# Patient Record
Sex: FEMALE | Race: WHITE
[De-identification: names, ages, dates, MRNs, and addresses within clinical notes are randomized per-mention and may not be internally consistent; named-entity substitution may affect disease eponyms.]

---

## 2017-02-23 ENCOUNTER — HOSPITAL ENCOUNTER (OUTPATIENT)
Dept: HOSPITAL 47 - LABWHC1 | Age: 72
Discharge: HOME | End: 2017-02-23
Attending: SURGERY
Payer: MEDICARE

## 2017-02-23 DIAGNOSIS — E21.3: Primary | ICD-10-CM

## 2017-02-23 DIAGNOSIS — Z90.89: ICD-10-CM

## 2017-02-23 PROCEDURE — 83970 ASSAY OF PARATHORMONE: CPT

## 2017-02-23 PROCEDURE — 36415 COLL VENOUS BLD VENIPUNCTURE: CPT

## 2017-02-23 PROCEDURE — 82310 ASSAY OF CALCIUM: CPT

## 2017-06-08 ENCOUNTER — HOSPITAL ENCOUNTER (OUTPATIENT)
Dept: HOSPITAL 47 - RADXRMAIN | Age: 72
End: 2017-06-08
Payer: MEDICARE

## 2017-06-08 DIAGNOSIS — R91.1: Primary | ICD-10-CM

## 2017-06-08 PROCEDURE — 71020: CPT

## 2017-06-08 NOTE — XR
EXAMINATION TYPE: XR chest 2V

 

DATE OF EXAM: 6/8/2017

 

COMPARISON: 9/8/2016 and 8/10/2015

 

HISTORY: 71-year-old female solitary pulmonary nodule

 

TECHNIQUE:  Frontal and lateral views

 

FINDINGS:  

Heart is upper limits of normal in size. Aorta and pulmonary vasculature within normal limits. A 1.3 
cm high density nodule persists at the right base without significant interval change. There may be c
entral calcification present. No consolidation or pleural effusion.

 

 

IMPRESSION:  

 

1. Heart at the upper limits of normal in size.

2. Right basilar pulmonary nodule seems to have a central calcification and is relatively similar to 
8/10/2015. Benign etiology such as a calcified granuloma is favored.

## 2017-10-19 ENCOUNTER — HOSPITAL ENCOUNTER (OUTPATIENT)
Dept: HOSPITAL 47 - RADXRMAIN | Age: 72
End: 2017-10-19
Payer: MEDICARE

## 2017-10-19 DIAGNOSIS — M16.0: Primary | ICD-10-CM

## 2017-10-19 PROCEDURE — 73521 X-RAY EXAM HIPS BI 2 VIEWS: CPT

## 2017-10-19 NOTE — XR
EXAMINATION TYPE: XR Hip Bilateral and AP pelvis

 

DATE OF EXAM: 10/19/2017

 

COMPARISON: NONE

 

HISTORY: 72 year-old female right hip pain

 

TECHNIQUE: AP view pelvis and 2 views each hip

 

FINDINGS:

Moderate uniform joint space narrowing within the left greater than right hips. There is prominent ma
rginal spurring and subchondral sclerosis. Pubic symphysis and SI joints appear intact. No acute frac
ture, subluxation, or dislocation.

 

 

IMPRESSION:

Moderate left greater than right bilateral hip osteoarthrosis. No acute osseous abnormality seen.

## 2018-03-22 ENCOUNTER — HOSPITAL ENCOUNTER (OUTPATIENT)
Dept: HOSPITAL 47 - RADMAMWWP | Age: 73
Discharge: HOME | End: 2018-03-22
Payer: MEDICARE

## 2018-03-22 ENCOUNTER — HOSPITAL ENCOUNTER (OUTPATIENT)
Dept: HOSPITAL 47 - LABWHC1 | Age: 73
Discharge: HOME | End: 2018-03-22
Payer: MEDICARE

## 2018-03-22 DIAGNOSIS — G45.9: Primary | ICD-10-CM

## 2018-03-22 DIAGNOSIS — Z12.31: Primary | ICD-10-CM

## 2018-03-22 LAB
CHOLEST SERPL-MCNC: 152 MG/DL (ref ?–200)
HDLC SERPL-MCNC: 83 MG/DL (ref 40–60)
LDLC SERPL CALC-MCNC: 53 MG/DL (ref 0–99)
T4 FREE SERPL-MCNC: 1.26 NG/DL (ref 0.78–2.19)
TRIGL SERPL-MCNC: 80 MG/DL (ref ?–150)

## 2018-03-22 PROCEDURE — 84481 FREE ASSAY (FT-3): CPT

## 2018-03-22 PROCEDURE — 77067 SCR MAMMO BI INCL CAD: CPT

## 2018-03-22 PROCEDURE — 77063 BREAST TOMOSYNTHESIS BI: CPT

## 2018-03-22 PROCEDURE — 80061 LIPID PANEL: CPT

## 2018-03-22 PROCEDURE — 84439 ASSAY OF FREE THYROXINE: CPT

## 2018-03-22 PROCEDURE — 84443 ASSAY THYROID STIM HORMONE: CPT

## 2018-03-22 PROCEDURE — 36415 COLL VENOUS BLD VENIPUNCTURE: CPT

## 2018-03-23 NOTE — MM
Reason for exam: screening  (asymptomatic).

Last mammogram was performed 1 year and 8 months ago.



History:

Patient is postmenopausal.



Physical Findings:

A clinical breast exam by your physician is recommended on an annual basis and 

results should be correlated with mammographic findings.



MG 3D Screening Mammo W/Cad

Bilateral CC and MLO view(s) were taken.

Prior study comparison: July 15, 2016, bilateral MG screening mammo w CAD.

There are scattered fibroglandular densities.  No significant changes when 

compared with prior studies.





ASSESSMENT: Negative, BI-RAD 1



RECOMMENDATION:

Routine screening mammogram of both breasts in 1 year.

## 2018-10-30 ENCOUNTER — HOSPITAL ENCOUNTER (OUTPATIENT)
Dept: HOSPITAL 47 - RADXRMAIN | Age: 73
Discharge: HOME | End: 2018-10-30
Payer: MEDICARE

## 2018-10-30 DIAGNOSIS — Z01.812: ICD-10-CM

## 2018-10-30 DIAGNOSIS — Z01.818: Primary | ICD-10-CM

## 2018-10-30 LAB
ALBUMIN SERPL-MCNC: 4.3 G/DL (ref 3.5–5)
ALP SERPL-CCNC: 117 U/L (ref 38–126)
ALT SERPL-CCNC: 21 U/L (ref 9–52)
ANION GAP SERPL CALC-SCNC: 10 MMOL/L
APTT BLD: 24.1 SEC (ref 22–30)
AST SERPL-CCNC: 23 U/L (ref 14–36)
BASOPHILS # BLD AUTO: 0.1 K/UL (ref 0–0.2)
BASOPHILS NFR BLD AUTO: 1 %
BUN SERPL-SCNC: 15 MG/DL (ref 7–17)
CALCIUM SPEC-MCNC: 9.7 MG/DL (ref 8.4–10.2)
CHLORIDE SERPL-SCNC: 107 MMOL/L (ref 98–107)
CO2 SERPL-SCNC: 23 MMOL/L (ref 22–30)
EOSINOPHIL # BLD AUTO: 0.1 K/UL (ref 0–0.7)
EOSINOPHIL NFR BLD AUTO: 1 %
ERYTHROCYTE [DISTWIDTH] IN BLOOD BY AUTOMATED COUNT: 4.94 M/UL (ref 3.8–5.4)
ERYTHROCYTE [DISTWIDTH] IN BLOOD: 13.2 % (ref 11.5–15.5)
GLUCOSE SERPL-MCNC: 104 MG/DL (ref 74–99)
HCT VFR BLD AUTO: 44.4 % (ref 34–46)
HGB BLD-MCNC: 14.1 GM/DL (ref 11.4–16)
INR PPP: 1 (ref ?–1.2)
LYMPHOCYTES # SPEC AUTO: 1.9 K/UL (ref 1–4.8)
LYMPHOCYTES NFR SPEC AUTO: 28 %
MCH RBC QN AUTO: 28.6 PG (ref 25–35)
MCHC RBC AUTO-ENTMCNC: 31.8 G/DL (ref 31–37)
MCV RBC AUTO: 89.9 FL (ref 80–100)
MONOCYTES # BLD AUTO: 0.3 K/UL (ref 0–1)
MONOCYTES NFR BLD AUTO: 4 %
NEUTROPHILS # BLD AUTO: 4.2 K/UL (ref 1.3–7.7)
NEUTROPHILS NFR BLD AUTO: 64 %
PLATELET # BLD AUTO: 222 K/UL (ref 150–450)
POTASSIUM SERPL-SCNC: 4.5 MMOL/L (ref 3.5–5.1)
PROT SERPL-MCNC: 7 G/DL (ref 6.3–8.2)
PT BLD: 9.7 SEC (ref 9–12)
SODIUM SERPL-SCNC: 140 MMOL/L (ref 137–145)
WBC # BLD AUTO: 6.6 K/UL (ref 3.8–10.6)

## 2018-10-30 PROCEDURE — 71046 X-RAY EXAM CHEST 2 VIEWS: CPT

## 2018-10-30 PROCEDURE — 85025 COMPLETE CBC W/AUTO DIFF WBC: CPT

## 2018-10-30 PROCEDURE — 36415 COLL VENOUS BLD VENIPUNCTURE: CPT

## 2018-10-30 PROCEDURE — 80053 COMPREHEN METABOLIC PANEL: CPT

## 2018-10-30 PROCEDURE — 85610 PROTHROMBIN TIME: CPT

## 2018-10-30 PROCEDURE — 85730 THROMBOPLASTIN TIME PARTIAL: CPT

## 2018-10-30 NOTE — XR
EXAMINATION TYPE: XR chest 2V

 

DATE OF EXAM: 10/30/2018

 

COMPARISON: 6/8/17

 

HISTORY: Shortness of breath

 

TECHNIQUE:  Frontal and lateral views of the chest are obtained.

 

FINDINGS:

 

Scattered senescent parenchymal changes noted. Hyperinflation compatible with COPD. Stable right lowe
r lobe granuloma.

 

No evidence for infiltrate. No evidence for atelectasis.

 

Heart size is stable.

 

Mediastinal structures are stable and grossly unremarkable.

 

No evidence for hilar prominence.

 

Degenerative changes dorsal spine. 

 

IMPRESSION:

1. No evidence for acute pulmonary disease.

## 2018-11-06 ENCOUNTER — HOSPITAL ENCOUNTER (OUTPATIENT)
Dept: HOSPITAL 47 - LABPAT | Age: 73
Discharge: HOME | End: 2018-11-06
Attending: ORTHOPAEDIC SURGERY
Payer: MEDICARE

## 2018-11-06 DIAGNOSIS — M16.11: ICD-10-CM

## 2018-11-06 DIAGNOSIS — Z01.812: Primary | ICD-10-CM

## 2018-11-06 PROCEDURE — 86850 RBC ANTIBODY SCREEN: CPT

## 2018-11-06 PROCEDURE — 87070 CULTURE OTHR SPECIMN AEROBIC: CPT

## 2018-11-06 PROCEDURE — 36415 COLL VENOUS BLD VENIPUNCTURE: CPT

## 2018-11-06 PROCEDURE — 86900 BLOOD TYPING SEROLOGIC ABO: CPT

## 2018-11-06 PROCEDURE — 86901 BLOOD TYPING SEROLOGIC RH(D): CPT

## 2021-04-29 ENCOUNTER — HOSPITAL ENCOUNTER (EMERGENCY)
Dept: HOSPITAL 47 - EC | Age: 76
Discharge: HOME | End: 2021-04-29
Payer: MEDICARE

## 2021-04-29 VITALS — DIASTOLIC BLOOD PRESSURE: 76 MMHG | HEART RATE: 70 BPM | SYSTOLIC BLOOD PRESSURE: 142 MMHG

## 2021-04-29 VITALS — TEMPERATURE: 97.8 F | RESPIRATION RATE: 18 BRPM

## 2021-04-29 DIAGNOSIS — Z95.5: ICD-10-CM

## 2021-04-29 DIAGNOSIS — E07.9: ICD-10-CM

## 2021-04-29 DIAGNOSIS — I10: ICD-10-CM

## 2021-04-29 DIAGNOSIS — I25.10: ICD-10-CM

## 2021-04-29 DIAGNOSIS — M79.10: Primary | ICD-10-CM

## 2021-04-29 DIAGNOSIS — J44.9: ICD-10-CM

## 2021-04-29 DIAGNOSIS — F17.200: ICD-10-CM

## 2021-04-29 LAB
ALBUMIN SERPL-MCNC: 4.7 G/DL (ref 3.5–5)
ALP SERPL-CCNC: 126 U/L (ref 38–126)
ALT SERPL-CCNC: 15 U/L (ref 4–34)
ANION GAP SERPL CALC-SCNC: 8 MMOL/L
AST SERPL-CCNC: 26 U/L (ref 14–36)
BASOPHILS # BLD AUTO: 0.1 K/UL (ref 0–0.2)
BASOPHILS NFR BLD AUTO: 1 %
BUN SERPL-SCNC: 15 MG/DL (ref 7–17)
CALCIUM SPEC-MCNC: 10.1 MG/DL (ref 8.4–10.2)
CHLORIDE SERPL-SCNC: 104 MMOL/L (ref 98–107)
CO2 SERPL-SCNC: 27 MMOL/L (ref 22–30)
EOSINOPHIL # BLD AUTO: 0.1 K/UL (ref 0–0.7)
EOSINOPHIL NFR BLD AUTO: 1 %
ERYTHROCYTE [DISTWIDTH] IN BLOOD BY AUTOMATED COUNT: 5.41 M/UL (ref 3.8–5.4)
ERYTHROCYTE [DISTWIDTH] IN BLOOD: 13.8 % (ref 11.5–15.5)
GLUCOSE SERPL-MCNC: 108 MG/DL (ref 74–99)
HCT VFR BLD AUTO: 47.3 % (ref 34–46)
HGB BLD-MCNC: 15 GM/DL (ref 11.4–16)
LYMPHOCYTES # SPEC AUTO: 1.6 K/UL (ref 1–4.8)
LYMPHOCYTES NFR SPEC AUTO: 15 %
MAGNESIUM SPEC-SCNC: 2 MG/DL (ref 1.6–2.3)
MCH RBC QN AUTO: 27.8 PG (ref 25–35)
MCHC RBC AUTO-ENTMCNC: 31.7 G/DL (ref 31–37)
MCV RBC AUTO: 87.5 FL (ref 80–100)
MONOCYTES # BLD AUTO: 0.3 K/UL (ref 0–1)
MONOCYTES NFR BLD AUTO: 3 %
NEUTROPHILS # BLD AUTO: 8.6 K/UL (ref 1.3–7.7)
NEUTROPHILS NFR BLD AUTO: 80 %
PH UR: 6 [PH] (ref 5–8)
PLATELET # BLD AUTO: 217 K/UL (ref 150–450)
POTASSIUM SERPL-SCNC: 4.8 MMOL/L (ref 3.5–5.1)
PROT SERPL-MCNC: 7.3 G/DL (ref 6.3–8.2)
RBC UR QL: <1 /HPF (ref 0–5)
SODIUM SERPL-SCNC: 139 MMOL/L (ref 137–145)
SP GR UR: 1 (ref 1–1.03)
SQUAMOUS UR QL AUTO: <1 /HPF (ref 0–4)
UROBILINOGEN UR QL STRIP: <2 MG/DL (ref ?–2)
WBC # BLD AUTO: 10.7 K/UL (ref 3.8–10.6)
WBC # UR AUTO: <1 /HPF (ref 0–5)

## 2021-04-29 PROCEDURE — 36415 COLL VENOUS BLD VENIPUNCTURE: CPT

## 2021-04-29 PROCEDURE — 84484 ASSAY OF TROPONIN QUANT: CPT

## 2021-04-29 PROCEDURE — 93005 ELECTROCARDIOGRAM TRACING: CPT

## 2021-04-29 PROCEDURE — 80053 COMPREHEN METABOLIC PANEL: CPT

## 2021-04-29 PROCEDURE — 83735 ASSAY OF MAGNESIUM: CPT

## 2021-04-29 PROCEDURE — 85025 COMPLETE CBC W/AUTO DIFF WBC: CPT

## 2021-04-29 PROCEDURE — 99283 EMERGENCY DEPT VISIT LOW MDM: CPT

## 2021-04-29 PROCEDURE — 84100 ASSAY OF PHOSPHORUS: CPT

## 2021-04-29 PROCEDURE — 81001 URINALYSIS AUTO W/SCOPE: CPT

## 2021-04-29 PROCEDURE — 83970 ASSAY OF PARATHORMONE: CPT

## 2021-04-29 NOTE — ED
Recheck HPI





- General


Chief Complaint: Recheck/Abnormal Lab/Rx


Stated Complaint: lab recheck/feels off


Time Seen by Provider: 21 10:45


Source: patient, family


Mode of arrival: ambulatory


Limitations: no limitations





- History of Present Illness


Initial Comments: 


75-year-old female with history of COPD, CVA, CAD, electrolyte imbalance, 

presents to the emergency department with a chief complaint of "feeling off".  

States her symptoms began about one day ago and she feels like there is achiness

in her legs and arms.  Patient states it is difficult to explain her feeling but

she's had this twice before and her electrolyte levels were imbalance.  States 

her calcium level was affected.  States she recently had her parathyroid hormone

checked and it was elevated.  States she had parathyroid surgery at the 

[facility].  She denies any chest pain, shortness of breath, one-

sided weakness or paresthesias.  Patient states there is no headaches, visual 

changes, chest pain or shortness of breath.  Denies any obstructive or 

infectious urinary symptoms.  Denies fevers or chills.  Had  covid-19ld vaccine 

in January.  Daughter is concerned that the patient is experiencing anxiety and 

has stopped taking her anxiolytics.  Patient has been going through quite a bit 

of stress over the last year





- Related Data


                                Home Medications











 Medication  Instructions  Recorded  Confirmed


 


amLODIPine BESYLATE [Norvasc] 5 mg PO BID 16


 


Citalopram Hydrobromide [CeleXA] 40 mg PO DAILY 17


 


Omeprazole 20 mg PO DAILY 17


 


Atorvastatin [Lipitor] 40 mg PO HS 21








                                  Previous Rx's











 Medication  Instructions  Recorded


 


Clopidogrel [Plavix] 75 mg PO DAILY #30 tab 17











                                    Allergies











Allergy/AdvReac Type Severity Reaction Status Date / Time


 


zolpidem tartrate AdvReac Severe Hallucinati Verified 21 11:12





[From Ambien]   ons  














Review of Systems


ROS Statement: 


Those systems with pertinent positive or pertinent negative responses have been 

documented in the HPI.





ROS Other: All systems not noted in ROS Statement are negative.





Past Medical History


Past Medical History: Coronary Artery Disease (CAD), Chest Pain / Angina, COPD, 

Hypertension, Syncope, Thyroid Disorder


Additional Past Medical History / Comment(s): thyroid nodules, high calcium


Last Myocardial Infarction Date:: 2015


History of Any Multi-Drug Resistant Organisms: None Reported


Past Surgical History: Heart Catheterization With Stent


Additional Past Surgical History / Comment(s): cyst removed from back, heart 

cath with a stent placed 2015. parathyroid removal


Past Anesthesia/Blood Transfusion Reactions: No Reported Reaction


Date of Last Stent Placement:: 2015


Past Psychological History: Anxiety, Depression


Smoking Status: Current every day smoker


Past Alcohol Use History: Occasional, Rare


Past Drug Use History: None Reported





- Past Family History


  ** Father


Family Medical History: No Reported History


Additional Family Medical History / Comment(s): FATHER  AT AGE 70. PT UNSURE

WHAT EXACTLY HE PASSED AWAY FROM.





  ** Mother


Family Medical History: Congestive Heart Failure (CHF)


Additional Family Medical History / Comment(s): MOTHER  AT AGE 91 YRS.





General Exam


Limitations: no limitations





Course


                                   Vital Signs











  21





  10:28 12:30 14:10


 


Temperature 97.8 F  97.8 F


 


Pulse Rate 75 54 L 70


 


Respiratory 18 18 18





Rate   


 


Blood Pressure 188/63 157/88 142/76


 


O2 Sat by Pulse 96 96 96





Oximetry   














Medical Decision Making





- Medical Decision Making





75-year-old female with history of COPD, CVA, CAD, electrolyte imbalance, 

presents to the emergency department with a chief complaint of "feeling off".  

On physical examination, patient is slightly anxious but is otherwise resting 

comfortably and answering questions appropriately.  CBC, CMP and UA 

unremarkable.  EKG showing no acute findings.  A lateral imbalance is noted.  

Parathyroid hormone levels pending.  No acute findings at this time there was 

just any requirements for admission.  Patient does not have  chest pain 

shortness of breath headaches or any focal deficits.  Patient states she will 

follow up with her primary care physician tonight or tomorrow morning.  Return p

arameters were discussed with patient was understanding and agreeable.  Case 

discussed with 





- Lab Data


Result diagrams: 


                                 21 11:08





                                 21 11:08


                                   Lab Results











  21 Range/Units





  11:08 11:08 11:08 


 


WBC  10.7 H    (3.8-10.6)  k/uL


 


RBC  5.41 H    (3.80-5.40)  m/uL


 


Hgb  15.0    (11.4-16.0)  gm/dL


 


Hct  47.3 H    (34.0-46.0)  %


 


MCV  87.5    (80.0-100.0)  fL


 


MCH  27.8    (25.0-35.0)  pg


 


MCHC  31.7    (31.0-37.0)  g/dL


 


RDW  13.8    (11.5-15.5)  %


 


Plt Count  217    (150-450)  k/uL


 


MPV  9.0    


 


Neutrophils %  80    %


 


Lymphocytes %  15    %


 


Monocytes %  3    %


 


Eosinophils %  1    %


 


Basophils %  1    %


 


Neutrophils #  8.6 H    (1.3-7.7)  k/uL


 


Lymphocytes #  1.6    (1.0-4.8)  k/uL


 


Monocytes #  0.3    (0-1.0)  k/uL


 


Eosinophils #  0.1    (0-0.7)  k/uL


 


Basophils #  0.1    (0-0.2)  k/uL


 


Sodium   139   (137-145)  mmol/L


 


Potassium   4.8   (3.5-5.1)  mmol/L


 


Chloride   104   ()  mmol/L


 


Carbon Dioxide   27   (22-30)  mmol/L


 


Anion Gap   8   mmol/L


 


BUN   15   (7-17)  mg/dL


 


Creatinine   0.77   (0.52-1.04)  mg/dL


 


Est GFR (CKD-EPI)AfAm   87   (>60 ml/min/1.73 sqM)  


 


Est GFR (CKD-EPI)NonAf   75   (>60 ml/min/1.73 sqM)  


 


Glucose   108 H   (74-99)  mg/dL


 


Calcium   10.1   (8.4-10.2)  mg/dL


 


Phosphorus   3.5   (2.5-4.5)  mg/dL


 


Magnesium   2.0   (1.6-2.3)  mg/dL


 


Total Bilirubin   0.6   (0.2-1.3)  mg/dL


 


AST   26   (14-36)  U/L


 


ALT   15   (4-34)  U/L


 


Alkaline Phosphatase   126   ()  U/L


 


Troponin I     (0.000-0.034)  ng/mL


 


Total Protein   7.3   (6.3-8.2)  g/dL


 


Albumin   4.7   (3.5-5.0)  g/dL


 


Urine Color    Light Yellow  


 


Urine Appearance    Clear  (Clear)  


 


Urine pH    6.0  (5.0-8.0)  


 


Ur Specific Gravity    1.005  (1.001-1.035)  


 


Urine Protein    Negative  (Negative)  


 


Urine Glucose (UA)    Negative  (Negative)  


 


Urine Ketones    Negative  (Negative)  


 


Urine Blood    Negative  (Negative)  


 


Urine Nitrite    Negative  (Negative)  


 


Urine Bilirubin    Negative  (Negative)  


 


Urine Urobilinogen    <2.0  (<2.0)  mg/dL


 


Ur Leukocyte Esterase    Moderate H  (Negative)  


 


Urine RBC    <1  (0-5)  /hpf


 


Urine WBC    <1  (0-5)  /hpf


 


Ur Squamous Epith Cells    <1  (0-4)  /hpf














  21 Range/Units





  11:08 


 


WBC   (3.8-10.6)  k/uL


 


RBC   (3.80-5.40)  m/uL


 


Hgb   (11.4-16.0)  gm/dL


 


Hct   (34.0-46.0)  %


 


MCV   (80.0-100.0)  fL


 


MCH   (25.0-35.0)  pg


 


MCHC   (31.0-37.0)  g/dL


 


RDW   (11.5-15.5)  %


 


Plt Count   (150-450)  k/uL


 


MPV   


 


Neutrophils %   %


 


Lymphocytes %   %


 


Monocytes %   %


 


Eosinophils %   %


 


Basophils %   %


 


Neutrophils #   (1.3-7.7)  k/uL


 


Lymphocytes #   (1.0-4.8)  k/uL


 


Monocytes #   (0-1.0)  k/uL


 


Eosinophils #   (0-0.7)  k/uL


 


Basophils #   (0-0.2)  k/uL


 


Sodium   (137-145)  mmol/L


 


Potassium   (3.5-5.1)  mmol/L


 


Chloride   ()  mmol/L


 


Carbon Dioxide   (22-30)  mmol/L


 


Anion Gap   mmol/L


 


BUN   (7-17)  mg/dL


 


Creatinine   (0.52-1.04)  mg/dL


 


Est GFR (CKD-EPI)AfAm   (>60 ml/min/1.73 sqM)  


 


Est GFR (CKD-EPI)NonAf   (>60 ml/min/1.73 sqM)  


 


Glucose   (74-99)  mg/dL


 


Calcium   (8.4-10.2)  mg/dL


 


Phosphorus   (2.5-4.5)  mg/dL


 


Magnesium   (1.6-2.3)  mg/dL


 


Total Bilirubin   (0.2-1.3)  mg/dL


 


AST   (14-36)  U/L


 


ALT   (4-34)  U/L


 


Alkaline Phosphatase   ()  U/L


 


Troponin I  <0.012  (0.000-0.034)  ng/mL


 


Total Protein   (6.3-8.2)  g/dL


 


Albumin   (3.5-5.0)  g/dL


 


Urine Color   


 


Urine Appearance   (Clear)  


 


Urine pH   (5.0-8.0)  


 


Ur Specific Gravity   (1.001-1.035)  


 


Urine Protein   (Negative)  


 


Urine Glucose (UA)   (Negative)  


 


Urine Ketones   (Negative)  


 


Urine Blood   (Negative)  


 


Urine Nitrite   (Negative)  


 


Urine Bilirubin   (Negative)  


 


Urine Urobilinogen   (<2.0)  mg/dL


 


Ur Leukocyte Esterase   (Negative)  


 


Urine RBC   (0-5)  /hpf


 


Urine WBC   (0-5)  /hpf


 


Ur Squamous Epith Cells   (0-4)  /hpf














Disposition


Clinical Impression: 


 Generalized body aches





Disposition: HOME SELF-CARE


Condition: Stable


Instructions (If sedation given, give patient instructions):  Parathyroid 

Hormone (By injection), Hypocalcemia (ED)


Additional Instructions: 


Follow-up with her primary care physician.  Return to emergency department if 

symptoms worsen.


Is patient prescribed a controlled substance at d/c from ED?: No


Referrals: 


Sylwia Slaughter MD [Primary Care Provider] - 1-2 days


Time of Disposition: 13:44

## 2021-05-16 ENCOUNTER — HOSPITAL ENCOUNTER (INPATIENT)
Dept: HOSPITAL 47 - EC | Age: 76
LOS: 4 days | Discharge: HOME HEALTH | DRG: 689 | End: 2021-05-20
Attending: HOSPITALIST | Admitting: HOSPITALIST
Payer: MEDICARE

## 2021-05-16 DIAGNOSIS — E53.1: ICD-10-CM

## 2021-05-16 DIAGNOSIS — F17.210: ICD-10-CM

## 2021-05-16 DIAGNOSIS — E78.5: ICD-10-CM

## 2021-05-16 DIAGNOSIS — Z87.440: ICD-10-CM

## 2021-05-16 DIAGNOSIS — Z82.49: ICD-10-CM

## 2021-05-16 DIAGNOSIS — Z87.2: ICD-10-CM

## 2021-05-16 DIAGNOSIS — J44.9: ICD-10-CM

## 2021-05-16 DIAGNOSIS — M85.80: ICD-10-CM

## 2021-05-16 DIAGNOSIS — K59.00: ICD-10-CM

## 2021-05-16 DIAGNOSIS — F41.9: ICD-10-CM

## 2021-05-16 DIAGNOSIS — I25.2: ICD-10-CM

## 2021-05-16 DIAGNOSIS — Z98.890: ICD-10-CM

## 2021-05-16 DIAGNOSIS — Z95.5: ICD-10-CM

## 2021-05-16 DIAGNOSIS — Z79.899: ICD-10-CM

## 2021-05-16 DIAGNOSIS — I25.82: ICD-10-CM

## 2021-05-16 DIAGNOSIS — Z71.6: ICD-10-CM

## 2021-05-16 DIAGNOSIS — N39.0: Primary | ICD-10-CM

## 2021-05-16 DIAGNOSIS — Z88.8: ICD-10-CM

## 2021-05-16 DIAGNOSIS — I65.21: ICD-10-CM

## 2021-05-16 DIAGNOSIS — E89.2: ICD-10-CM

## 2021-05-16 DIAGNOSIS — I25.10: ICD-10-CM

## 2021-05-16 DIAGNOSIS — I67.89: ICD-10-CM

## 2021-05-16 DIAGNOSIS — E53.8: ICD-10-CM

## 2021-05-16 DIAGNOSIS — H54.7: ICD-10-CM

## 2021-05-16 DIAGNOSIS — F32.9: ICD-10-CM

## 2021-05-16 DIAGNOSIS — Z20.822: ICD-10-CM

## 2021-05-16 DIAGNOSIS — G92: ICD-10-CM

## 2021-05-16 DIAGNOSIS — E03.9: ICD-10-CM

## 2021-05-16 DIAGNOSIS — H91.90: ICD-10-CM

## 2021-05-16 DIAGNOSIS — R44.1: ICD-10-CM

## 2021-05-16 DIAGNOSIS — J84.10: ICD-10-CM

## 2021-05-16 DIAGNOSIS — I10: ICD-10-CM

## 2021-05-16 DIAGNOSIS — F03.90: ICD-10-CM

## 2021-05-16 DIAGNOSIS — Z79.02: ICD-10-CM

## 2021-05-16 LAB
ALBUMIN SERPL-MCNC: 4.2 G/DL (ref 3.5–5)
ALP SERPL-CCNC: 117 U/L (ref 38–126)
ALT SERPL-CCNC: 10 U/L (ref 4–34)
ANION GAP SERPL CALC-SCNC: 6 MMOL/L
APTT BLD: 22.2 SEC (ref 22–30)
AST SERPL-CCNC: 21 U/L (ref 14–36)
BASOPHILS # BLD AUTO: 0.1 K/UL (ref 0–0.2)
BASOPHILS NFR BLD AUTO: 1 %
BUN SERPL-SCNC: 16 MG/DL (ref 7–17)
CALCIUM SPEC-MCNC: 9.8 MG/DL (ref 8.4–10.2)
CHLORIDE SERPL-SCNC: 103 MMOL/L (ref 98–107)
CO2 SERPL-SCNC: 28 MMOL/L (ref 22–30)
EOSINOPHIL # BLD AUTO: 0.1 K/UL (ref 0–0.7)
EOSINOPHIL NFR BLD AUTO: 1 %
ERYTHROCYTE [DISTWIDTH] IN BLOOD BY AUTOMATED COUNT: 4.75 M/UL (ref 3.8–5.4)
ERYTHROCYTE [DISTWIDTH] IN BLOOD: 13.5 % (ref 11.5–15.5)
GLUCOSE SERPL-MCNC: 119 MG/DL (ref 74–99)
HCT VFR BLD AUTO: 40.8 % (ref 34–46)
HGB BLD-MCNC: 13.8 GM/DL (ref 11.4–16)
HYALINE CASTS UR QL AUTO: 1 /LPF (ref 0–2)
INR PPP: 0.9 (ref ?–1.2)
LYMPHOCYTES # SPEC AUTO: 1.8 K/UL (ref 1–4.8)
LYMPHOCYTES NFR SPEC AUTO: 20 %
MAGNESIUM SPEC-SCNC: 1.9 MG/DL (ref 1.6–2.3)
MCH RBC QN AUTO: 29.2 PG (ref 25–35)
MCHC RBC AUTO-ENTMCNC: 33.9 G/DL (ref 31–37)
MCV RBC AUTO: 86 FL (ref 80–100)
MONOCYTES # BLD AUTO: 0.5 K/UL (ref 0–1)
MONOCYTES NFR BLD AUTO: 6 %
NEUTROPHILS # BLD AUTO: 6.5 K/UL (ref 1.3–7.7)
NEUTROPHILS NFR BLD AUTO: 72 %
PH UR: 6 [PH] (ref 5–8)
PLATELET # BLD AUTO: 196 K/UL (ref 150–450)
POTASSIUM SERPL-SCNC: 4.1 MMOL/L (ref 3.5–5.1)
PROT SERPL-MCNC: 6.5 G/DL (ref 6.3–8.2)
PT BLD: 9.8 SEC (ref 9–12)
RBC UR QL: 1 /HPF (ref 0–5)
SODIUM SERPL-SCNC: 137 MMOL/L (ref 137–145)
SP GR UR: 1.02 (ref 1–1.03)
SQUAMOUS UR QL AUTO: 1 /HPF (ref 0–4)
UROBILINOGEN UR QL STRIP: <2 MG/DL (ref ?–2)
WBC # BLD AUTO: 9.1 K/UL (ref 3.8–10.6)
WBC #/AREA URNS HPF: 25 /HPF (ref 0–5)

## 2021-05-16 PROCEDURE — 83735 ASSAY OF MAGNESIUM: CPT

## 2021-05-16 PROCEDURE — 36415 COLL VENOUS BLD VENIPUNCTURE: CPT

## 2021-05-16 PROCEDURE — 84425 ASSAY OF VITAMIN B-1: CPT

## 2021-05-16 PROCEDURE — 84443 ASSAY THYROID STIM HORMONE: CPT

## 2021-05-16 PROCEDURE — 82607 VITAMIN B-12: CPT

## 2021-05-16 PROCEDURE — 85730 THROMBOPLASTIN TIME PARTIAL: CPT

## 2021-05-16 PROCEDURE — 85025 COMPLETE CBC W/AUTO DIFF WBC: CPT

## 2021-05-16 PROCEDURE — 99285 EMERGENCY DEPT VISIT HI MDM: CPT

## 2021-05-16 PROCEDURE — 85610 PROTHROMBIN TIME: CPT

## 2021-05-16 PROCEDURE — 94760 N-INVAS EAR/PLS OXIMETRY 1: CPT

## 2021-05-16 PROCEDURE — 80306 DRUG TEST PRSMV INSTRMNT: CPT

## 2021-05-16 PROCEDURE — 93306 TTE W/DOPPLER COMPLETE: CPT

## 2021-05-16 PROCEDURE — 80048 BASIC METABOLIC PNL TOTAL CA: CPT

## 2021-05-16 PROCEDURE — 87086 URINE CULTURE/COLONY COUNT: CPT

## 2021-05-16 PROCEDURE — 87635 SARS-COV-2 COVID-19 AMP PRB: CPT

## 2021-05-16 PROCEDURE — 95816 EEG AWAKE AND DROWSY: CPT

## 2021-05-16 PROCEDURE — 84207 ASSAY OF VITAMIN B-6: CPT

## 2021-05-16 PROCEDURE — 84145 PROCALCITONIN (PCT): CPT

## 2021-05-16 PROCEDURE — 70553 MRI BRAIN STEM W/O & W/DYE: CPT

## 2021-05-16 PROCEDURE — 70450 CT HEAD/BRAIN W/O DYE: CPT

## 2021-05-16 PROCEDURE — 80076 HEPATIC FUNCTION PANEL: CPT

## 2021-05-16 PROCEDURE — 87040 BLOOD CULTURE FOR BACTERIA: CPT

## 2021-05-16 PROCEDURE — 82746 ASSAY OF FOLIC ACID SERUM: CPT

## 2021-05-16 PROCEDURE — 93005 ELECTROCARDIOGRAM TRACING: CPT

## 2021-05-16 PROCEDURE — 93880 EXTRACRANIAL BILAT STUDY: CPT

## 2021-05-16 PROCEDURE — 80053 COMPREHEN METABOLIC PANEL: CPT

## 2021-05-16 PROCEDURE — 71046 X-RAY EXAM CHEST 2 VIEWS: CPT

## 2021-05-16 PROCEDURE — 84484 ASSAY OF TROPONIN QUANT: CPT

## 2021-05-16 PROCEDURE — 81001 URINALYSIS AUTO W/SCOPE: CPT

## 2021-05-16 RX ADMIN — HEPARIN SODIUM SCH: 5000 INJECTION INTRAVENOUS; SUBCUTANEOUS at 20:03

## 2021-05-16 RX ADMIN — HEPARIN SODIUM SCH UNIT: 5000 INJECTION INTRAVENOUS; SUBCUTANEOUS at 15:41

## 2021-05-16 RX ADMIN — CEFAZOLIN SCH: 330 INJECTION, POWDER, FOR SOLUTION INTRAMUSCULAR; INTRAVENOUS at 05:53

## 2021-05-16 RX ADMIN — ATORVASTATIN CALCIUM SCH MG: 40 TABLET, FILM COATED ORAL at 20:03

## 2021-05-16 RX ADMIN — CLOPIDOGREL BISULFATE SCH MG: 75 TABLET ORAL at 15:41

## 2021-05-16 NOTE — CT
EXAM:

  CT Head Without Intravenous Contrast

 

CLINICAL HISTORY:

  ITS.REASON CT Reason: altered mental status

 

TECHNIQUE:

  Axial computed tomography images of the head/brain without intravenous 

contrast.  CTDI is 49.2 mGy and DLP is 1145.4 mGy-cm.  This CT exam was 

performed using one or more of the following dose reduction techniques: 

automated exposure control, adjustment of the mA and/or kV according to 

patient size, and/or use of iterative reconstruction technique.

 

COMPARISON:

  No relevant prior studies available.

 

FINDINGS:

  Brain:  Unremarkable.  No hemorrhage.  No significant white matter 

disease.  No edema.

  Ventricles:  Unremarkable.  No ventriculomegaly.

  Bones/joints:  Unremarkable.  No acute fracture.

  Soft tissues:  Unremarkable.

  Sinuses:  Unremarkable as visualized.  No acute sinusitis.

  Mastoid air cells:  Unremarkable as visualized.  No mastoid effusion.

 

IMPRESSION:     

No acute intracranial process
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
## 2021-05-16 NOTE — ED
Altered Mental Status HPI





- General


Chief Complaint: Arrhythmia/Palpitations


Stated Complaint: Palpitations


Time Seen by Provider: 21 01:11


Source: patient


Mode of arrival: ambulatory


Limitations: no limitations





- History of Present Illness


MD Complaint: altered mental status, confusion


-: days(s)


Severity: moderate


Consistency of Symptoms: getting worse


Associated Symptoms: other (Palpitations)





- Related Data


                                Home Medications











 Medication  Instructions  Recorded  Confirmed


 


amLODIPine BESYLATE [Norvasc] 5 mg PO BID 16


 


Citalopram Hydrobromide [CeleXA] 40 mg PO DAILY 17


 


Omeprazole 20 mg PO DAILY 17


 


Atorvastatin [Lipitor] 40 mg PO HS 21


 


Ergocalciferol [Vitamin D2 (1250 1,250 mcg PO FR 21





Mcg = 49854 Iu)]   


 


Melatonin 20 mg PO HS PRN 21








                                  Previous Rx's











 Medication  Instructions  Recorded


 


Clopidogrel [Plavix] 75 mg PO DAILY #30 tab 17











                                    Allergies











Allergy/AdvReac Type Severity Reaction Status Date / Time


 


zolpidem tartrate AdvReac Severe Hallucinati Verified 21 09:40





[From Ambien]   ons  














Review of Systems


ROS Statement: 


Those systems with pertinent positive or pertinent negative responses have been 

documented in the HPI.





ROS Other: All systems not noted in ROS Statement are negative.


Constitutional: Denies: fever, chills, weakness


Eyes: Denies: vision change


Respiratory: Denies: cough, dyspnea


Cardiovascular: Reports: palpitations.  Denies: chest pain, orthopnea, edema, 

syncope


Gastrointestinal: Denies: abdominal pain, nausea, vomiting, diarrhea


Genitourinary: Denies: dysuria, hematuria


Musculoskeletal: Denies: back pain


Skin: Denies: rash


Neurological: Denies: headache





Past Medical History


Past Medical History: Coronary Artery Disease (CAD), Chest Pain / Angina, COPD, 

Hypertension, Syncope, Thyroid Disorder


Additional Past Medical History / Comment(s): parathyroid nodules, high calcium


Last Myocardial Infarction Date:: 2015


History of Any Multi-Drug Resistant Organisms: None Reported


Past Surgical History: Heart Catheterization With Stent


Additional Past Surgical History / Comment(s): cyst removed from back, heart 

cath with a stent placed 2015. parathyroid removal


Past Anesthesia/Blood Transfusion Reactions: No Reported Reaction


Date of Last Stent Placement:: 2015


Past Psychological History: Anxiety, Depression


Smoking Status: Current some day smoker


Past Alcohol Use History: Occasional, Rare


Past Drug Use History: None Reported





- Past Family History


  ** Father


Family Medical History: No Reported History


Additional Family Medical History / Comment(s): FATHER  AT AGE 70. PT UNSURE

WHAT EXACTLY HE PASSED AWAY FROM.





  ** Mother


Family Medical History: Congestive Heart Failure (CHF)


Additional Family Medical History / Comment(s): MOTHER  AT AGE 91 YRS.





General Exam


Limitations: no limitations


General appearance: alert, in no apparent distress


Head exam: Present: atraumatic, normocephalic


Eye exam: Present: normal appearance.  Absent: scleral icterus, conjunctival 

injection


ENT exam: Present: mucous membranes dry


Neck exam: Present: normal inspection, full ROM


Respiratory exam: Present: normal lung sounds bilaterally.  Absent: respiratory 

distress, wheezes, rales, rhonchi, stridor


Cardiovascular Exam: Present: regular rate, irregular rhythm, normal heart 

sounds.  Absent: systolic murmur, diastolic murmur, rubs, gallop


GI/Abdominal exam: Present: soft.  Absent: distended, tenderness, guarding, 

rebound


Extremities exam: Present: normal inspection, normal capillary refill.  Absent: 

pedal edema, calf tenderness


Back exam: Present: normal inspection.  Absent: CVA tenderness (R), CVA 

tenderness (L)


Neurological exam: Present: alert, CN II-XII intact.  Absent: oriented X3 

(Patient oriented to person and place could not state the date), motor sensory 

deficit


Skin exam: Present: warm, dry, intact, normal color.  Absent: rash





Course


                                   Vital Signs











  21





  00:55 04:31


 


Pulse Rate 72 84


 


Respiratory 19 18





Rate  


 


Blood Pressure 156/84 152/83


 


O2 Sat by Pulse 97 98





Oximetry  














Medical Decision Making





- Medical Decision Making





Patient is 75-year-old woman brought to have evaluation for palpitations as well

as altered mental status.  There are no focal neurologic deficits, and at this 

point the differential does appear to be most likely some progressive dementia 

versus delirium.  The patient will be admitted to have neurology consultation as

well as psychiatric evaluation for further input.





- Lab Data


Result diagrams: 


                                 21 04:30





                                 21 04:30


                                   Lab Results











  21 Range/Units





  01:11 01:19 01:19 


 


WBC   9.1   (3.8-10.6)  k/uL


 


RBC   4.75   (3.80-5.40)  m/uL


 


Hgb   13.8   (11.4-16.0)  gm/dL


 


Hct   40.8   (34.0-46.0)  %


 


MCV   86.0   (80.0-100.0)  fL


 


MCH   29.2   (25.0-35.0)  pg


 


MCHC   33.9   (31.0-37.0)  g/dL


 


RDW   13.5   (11.5-15.5)  %


 


Plt Count   196   (150-450)  k/uL


 


MPV   8.9   


 


Neutrophils %   72   %


 


Lymphocytes %   20   %


 


Monocytes %   6   %


 


Eosinophils %   1   %


 


Basophils %   1   %


 


Neutrophils #   6.5   (1.3-7.7)  k/uL


 


Lymphocytes #   1.8   (1.0-4.8)  k/uL


 


Monocytes #   0.5   (0-1.0)  k/uL


 


Eosinophils #   0.1   (0-0.7)  k/uL


 


Basophils #   0.1   (0-0.2)  k/uL


 


PT    9.8  (9.0-12.0)  sec


 


INR    0.9  (<1.2)  


 


APTT    22.2  (22.0-30.0)  sec


 


Sodium     (137-145)  mmol/L


 


Potassium     (3.5-5.1)  mmol/L


 


Chloride     ()  mmol/L


 


Carbon Dioxide     (22-30)  mmol/L


 


Anion Gap     mmol/L


 


BUN     (7-17)  mg/dL


 


Creatinine     (0.52-1.04)  mg/dL


 


Est GFR (CKD-EPI)AfAm     (>60 ml/min/1.73 sqM)  


 


Est GFR (CKD-EPI)NonAf     (>60 ml/min/1.73 sqM)  


 


Glucose     (74-99)  mg/dL


 


Calcium     (8.4-10.2)  mg/dL


 


Magnesium     (1.6-2.3)  mg/dL


 


Total Bilirubin     (0.2-1.3)  mg/dL


 


AST     (14-36)  U/L


 


ALT     (4-34)  U/L


 


Alkaline Phosphatase     ()  U/L


 


Troponin I     (0.000-0.034)  ng/mL


 


Total Protein     (6.3-8.2)  g/dL


 


Albumin     (3.5-5.0)  g/dL


 


TSH     (0.465-4.680)  mIU/L


 


Urine Color  Yellow    


 


Urine Appearance  Clear    (Clear)  


 


Urine pH  6.0    (5.0-8.0)  


 


Ur Specific Gravity  1.018    (1.001-1.035)  


 


Urine Protein  Negative    (Negative)  


 


Urine Glucose (UA)  Negative    (Negative)  


 


Urine Ketones  Negative    (Negative)  


 


Urine Blood  Negative    (Negative)  


 


Urine Nitrite  Negative    (Negative)  


 


Urine Bilirubin  Negative    (Negative)  


 


Urine Urobilinogen  <2.0    (<2.0)  mg/dL


 


Ur Leukocyte Esterase  Large H    (Negative)  


 


Urine RBC  1    (0-5)  /hpf


 


Urine WBC  25 H    (0-5)  /hpf


 


Ur Squamous Epith Cells  1    (0-4)  /hpf


 


Urine Bacteria  Rare H    (None)  /hpf


 


Hyaline Casts  1    (0-2)  /lpf


 


Urine Mucus  Few H    (None)  /hpf


 


Coronavirus (PCR)     (Not Detectd)  














  21 Range/Units





  01:19 01:19 04:31 


 


WBC     (3.8-10.6)  k/uL


 


RBC     (3.80-5.40)  m/uL


 


Hgb     (11.4-16.0)  gm/dL


 


Hct     (34.0-46.0)  %


 


MCV     (80.0-100.0)  fL


 


MCH     (25.0-35.0)  pg


 


MCHC     (31.0-37.0)  g/dL


 


RDW     (11.5-15.5)  %


 


Plt Count     (150-450)  k/uL


 


MPV     


 


Neutrophils %     %


 


Lymphocytes %     %


 


Monocytes %     %


 


Eosinophils %     %


 


Basophils %     %


 


Neutrophils #     (1.3-7.7)  k/uL


 


Lymphocytes #     (1.0-4.8)  k/uL


 


Monocytes #     (0-1.0)  k/uL


 


Eosinophils #     (0-0.7)  k/uL


 


Basophils #     (0-0.2)  k/uL


 


PT     (9.0-12.0)  sec


 


INR     (<1.2)  


 


APTT     (22.0-30.0)  sec


 


Sodium  137    (137-145)  mmol/L


 


Potassium  4.1    (3.5-5.1)  mmol/L


 


Chloride  103    ()  mmol/L


 


Carbon Dioxide  28    (22-30)  mmol/L


 


Anion Gap  6    mmol/L


 


BUN  16    (7-17)  mg/dL


 


Creatinine  0.94    (0.52-1.04)  mg/dL


 


Est GFR (CKD-EPI)AfAm  68    (>60 ml/min/1.73 sqM)  


 


Est GFR (CKD-EPI)NonAf  59    (>60 ml/min/1.73 sqM)  


 


Glucose  119 H    (74-99)  mg/dL


 


Calcium  9.8    (8.4-10.2)  mg/dL


 


Magnesium  1.9    (1.6-2.3)  mg/dL


 


Total Bilirubin  0.3    (0.2-1.3)  mg/dL


 


AST  21    (14-36)  U/L


 


ALT  10    (4-34)  U/L


 


Alkaline Phosphatase  117    ()  U/L


 


Troponin I   <0.012   (0.000-0.034)  ng/mL


 


Total Protein  6.5    (6.3-8.2)  g/dL


 


Albumin  4.2    (3.5-5.0)  g/dL


 


TSH  2.870    (0.465-4.680)  mIU/L


 


Urine Color     


 


Urine Appearance     (Clear)  


 


Urine pH     (5.0-8.0)  


 


Ur Specific Gravity     (1.001-1.035)  


 


Urine Protein     (Negative)  


 


Urine Glucose (UA)     (Negative)  


 


Urine Ketones     (Negative)  


 


Urine Blood     (Negative)  


 


Urine Nitrite     (Negative)  


 


Urine Bilirubin     (Negative)  


 


Urine Urobilinogen     (<2.0)  mg/dL


 


Ur Leukocyte Esterase     (Negative)  


 


Urine RBC     (0-5)  /hpf


 


Urine WBC     (0-5)  /hpf


 


Ur Squamous Epith Cells     (0-4)  /hpf


 


Urine Bacteria     (None)  /hpf


 


Hyaline Casts     (0-2)  /lpf


 


Urine Mucus     (None)  /hpf


 


Coronavirus (PCR)    Not Detected  (Not Detectd)  














- EKG Data


-: EKG Interpreted by Me


EKG shows normal: sinus rhythm (With sinus arrhythmia, rate 65 bpm), axis 

(Normal), intervals (Normal), QRS complexes (Normal)


Rate: normal


Interpretation: nonspecific ST-T wave changes





Disposition


Clinical Impression: 


 Change in mental status





Disposition: ADMITTED AS IP TO THIS [facility]


Condition: Fair

## 2021-05-16 NOTE — US
EXAMINATION TYPE: US carotid duplex BILAT

 

DATE OF EXAM: 5/16/2021

 

COMPARISON: US 2017

 

CLINICAL HISTORY: stroke. Altered mental status

 

EXAM MEASUREMENTS: 

 

RIGHT:  Peak Systolic Velocity (PSV) cm/sec

----- Right CCA:  77.5  

----- Right ICA:  140     

----- Right ECA:  130   

ICA/CCA ratio:  1.8    

 

RIGHT:  End Diastole cm/sec

----- Right CCA:  24.1   

----- Right ICA:  27.8      

----- Right ECA:  0.0     

 

LEFT:  Peak Systolic Velocity (PSV) cm/sec

----- Left CCA:  104  

----- Left ICA:  121   

----- Left ECA:  91.6  

ICA/CCA ratio:  1.2  

 

LEFT:  End Diastole cm/sec

----- Left CCA:  19.5  

----- Left ICA:  25.3   

----- Left ECA:  14.9 

 

VERTEBRALS (direction of flow):

Right Vertebral: Antegrade

Left Vertebral: Antegrade

 

Rhythm:  Arrhythmia

 

Slightly elevated velocities, however no significant stenosis visualized

 

 

 

IMPRESSION:

There is antegrade flow in the vertebral arteries. The images and measurements suggest less than 50% 
stenosis in both internal carotid arteries. There is bilateral plaque formation. Velocity in the righ
t internal carotid artery appears increased compared to old exam that suggests increased plaque. The 
images show less than 50% stenosis but the velocity measurements suggest 50-70% stenosis in the right
 internal carotid artery.

 

Criteria for Assigning % of Stenosis / Diameter reduction

(Estimation based on the indirect measurements of the internal carotid artery velocities (ICA PSV).

1.  Normal (no stenosis)=ICA PSV < 125 cm/s: ratio < 2.0: ICA EDV<40 cm/s.

2. Less than 50% stenosis=ICA PSV < 125 cm/s: ratio < 2.0: ICA EDV<40 cm/s.

3.  50 to 69% stenosis=ICA PSV of 125 to 230 cm/s: ration 2.0 ? 4.0: ICA EDV  cm/s.

4.  Greater than 70% stenosis to near occlusion= ICA PSV > 230 cm/s: ratio > 4.0: ICA EDV > 100 cm/s.
 

5.  Near occlusion= ICA PSV velocities may be low or undetectable: variable ratio and ICA EDV.

6.  Total occlusion=unable to detect flow.

## 2021-05-16 NOTE — P.CNNES
History of Present Illness


Consult date: 21


Requesting physician: Yevgeniy Marion


Reason for Consult: altered mental status


History of Present Illness: 


This is a 75-year-old woman with medical history of coronary artery disease, 

hypertension who presented emergency department on the 2021 for altered 

mental status.  History was obtained from the medical record and the patient 

nurse since the patient's had a hard time to give me a good thorough history.  

According to the patient nurse she stated that she spoke with the patient's 

daughter and that she was told for the last 1 week she's a having visual 

hallucination of her son who is  for the past 1 year.  Upon talk to the 

patient she was tell me that about her having thoughts for the last couple days 

that her friend "Rosita" is about to come and kill her but then within a minute or 

2 she told me that her friend does not her to fly and she'll not never do that 

to her.  Per the ED notes it is mentioned that the patient was also complaining 

of palpitation.





Some of the workup consisted of:


Initial vital signs: Blood pressure of 156/84, heart rate of 72, respiratory of 

19, initial temperature of 98.2 Fahrenheit and pulse ox of 97% at room air.


CT of the head is reported as no acute intracranial process.  Personally 

reviewed the CT of the head and I felt the patient had mild-to-moderate the b

ilateral frontal atrophy and I felt it slightly more than appropriate for her 

age.


EKG is reported as sinus rhythm with markedly sinus arrhythmia.  Nonspecific ST 

abnormality.  Abnormal EKG.


White blood cell is 9.2 which is normal.


The basic chemistry panel is reviewed and seems unremarkable.  The glucose is 

119.


TSH is 2.87.


Chest x-ray is reported as cardiomegaly.  Osteopenia.  Calcified granuloma at 

the periphery of the right lung base.  Stable


Urinalysis is positive for large leukocyte esterase, urine white blood cells 25,

urine bacteria was rare.


Corona virus PCR was not detected.








Review of Systems


Review of system:  The 12 point system was reviewed and apparent positive and 

negative per HPI.








Past Medical History


Past Medical History: Coronary Artery Disease (CAD), Chest Pain / Angina, COPD, 

Hypertension, Syncope, Thyroid Disorder


Additional Past Medical History / Comment(s): parathyroid nodules, high calcium


Last Myocardial Infarction Date:: 2015


History of Any Multi-Drug Resistant Organisms: None Reported


Past Surgical History: Heart Catheterization With Stent


Additional Past Surgical History / Comment(s): cyst removed from back, heart 

cath with a stent placed 2015. parathyroid removal


Past Anesthesia/Blood Transfusion Reactions: No Reported Reaction


Date of Last Stent Placement:: 2015


Past Psychological History: Anxiety, Depression


Smoking Status: Current some day smoker


Past Alcohol Use History: Occasional, Rare


Past Drug Use History: None Reported





- Past Family History


  ** Father


Family Medical History: No Reported History


Additional Family Medical History / Comment(s): FATHER  AT AGE 70. PT UNSURE

WHAT EXACTLY HE PASSED AWAY FROM.





  ** Mother


Family Medical History: Congestive Heart Failure (CHF)


Additional Family Medical History / Comment(s): MOTHER  AT AGE 91 YRS.





Medications and Allergies


                                Home Medications











 Medication  Instructions  Recorded  Confirmed  Type


 


amLODIPine BESYLATE [Norvasc] 5 mg PO BID 16 History


 


Citalopram Hydrobromide [CeleXA] 40 mg PO DAILY 17 History


 


Omeprazole 20 mg PO DAILY 17 History


 


Clopidogrel [Plavix] 75 mg PO DAILY #30 tab 17 Rx


 


Atorvastatin [Lipitor] 40 mg PO HS 21 History


 


Ergocalciferol [Vitamin D2 (1250 1,250 mcg PO FR 21 History





Mcg = 63926 Iu)]    


 


Melatonin 20 mg PO HS PRN 21 History








                                    Allergies











Allergy/AdvReac Type Severity Reaction Status Date / Time


 


zolpidem tartrate AdvReac Severe Hallucinati Verified 21 09:40





[From Ambien]   ons  














Physical Examination





- Vital Signs


Vital Signs: 


                                   Vital Signs











  Temp Pulse Pulse Resp BP BP Pulse Ox


 


 21 07:45  97.7 F   52 L  16   159/75  94 L


 


 21 05:10  98.2 F   61  20   147/76  93 L


 


 21 04:31   84   18  152/83   98


 


 21 00:55   72   19  156/84   97








                                Intake and Output











 05/15/21 05/16/21 05/16/21





 22:59 06:59 14:59


 


Other:   


 


  # Voids  1 


 


  Weight  76.657 kg 











GENERAL: The patient is lying in bed and is not in acute distress.


CHEST: The heart rate is regular rate rhythm.   No murmurs to auscultation.  No 

carotid bruit bilaterally.


LUNG: Clear to auscultation bilaterally no wheezing noted throughout.  Not 

labored breathing.


ABDOMEN/GI: Bowel sounds present in all 4 quadrants. No tenderness to palpation 

throughout.





NEUROLOGICAL:


Higher mental function: The patient is awake, alert, oriented to self, place and

time.  She correctly stated the state was [place].  Patient is following simple

and complex commands.  No aphasia and no neglect.


Cranial nerves: The pupils are round, equal and reactive to light and 

accommodation.  Visual fields are full to confrontation throughout.  Extraocular

movement is intact no nystagmus is noted.  Facial sensation is normal to touch 

throughout.  The facial strength is normal throughout.  Hearing is normal 

bilaterally to hand rub.  Tongue is midline and moved side-to-side without any 

difficulty.  No dysarthria is noted.  Shoulder shrug is normal bilaterally.


Motor: Gait is deferred.  The strength is 5 over 5 throughout.  Normal tone and 

bulk.  


Cerebellum: Normal finger to nose heel to chin bilaterally.


Sensation: Sensation is normal to touch throughout.


Reflexes (right/left): 2+ throughout.


Plantars are downgoing bilaterally. 








Results





- Laboratory Findings


CBC and BMP: 


                                 21 01:19





                                 21 01:19


Abnormal Lab Findings: 


                                  Abnormal Labs











  21





  01:11 01:19


 


Glucose   119 H


 


Ur Leukocyte Esterase  Large H 


 


Urine WBC  25 H 


 


Urine Bacteria  Rare H 


 


Urine Mucus  Few H 














Assessment and Plan


Assessment: 








Altered mental status for the past one weeks (having visual hallucination of her

son who is  for the past one year and having thought that her friend is 

going to harm her in the past couple month): Possibly delirium (especially with 

underlying UTI).  I feel the patient possibly has underlying cognitive 

impairment/dementia


Septic encephalopathy from underlying UTI


Acute urinary tract infection


Calcified granuloma at the periphery of the right lung base Per CXR





Plan: 





* CT of the head is reported as no acute intracranial process.  Personally 

  reviewed the CT of the head and I felt the patient had mild-to-moderate the 

  bilateral frontal atrophy and I felt it slightly more than appropriate for her

  age.


* EKG is reported as sinus rhythm with markedly sinus arrhythmia.  Nonspecific 

  ST abnormality.  Abnormal EKG.


* TSH is 2.87.


* Chest x-ray is reported as cardiomegaly.  Osteopenia.  Calcified granuloma at 

  the periphery of the right lung base.  Stable.





* I ordered vitamin B12, folate, methylmalonic acid, Vitamin B6 level and sofi

  ine level


* I ordered MRI of the brain w/ and w/o especially because of the history of 

  calcified granuloma to rule out any intracranial lesion/process affecting 

  patient's mentation.


* Ordered routine EEG and will not start the patient on anti-epileptic drug 

  unless there is epileptiform discharges or seizure on the EEG.


* The ED team consulted also psychiatry.


* On discharge I recommend the patient to follow-up with a neurologist as 

  outpatient for further workup of cognitive impairment/dementia within 1-2 

  weeks.


* I will defer the rest of medical management to the primary team.


* Will attempt to speak with the patient's daughter.





The plan is discussed with the patient's nurse.


Thank you for the consultation.





Dr. Yap will  take over neurology service tomorrow AM (2021).





Elfego Koroma M.D.


Neuro-hospitalist








Time with Patient: Greater than 30

## 2021-05-16 NOTE — HP
HISTORY AND PHYSICAL



DATE OF SERVICE:

05/16/2021



CHIEF COMPLAINT:

Change in mental status and confusion.



HISTORY OF PRESENT ILLNESS:

This 75-year-old woman with a past medical history of multiple medical problems

including: CAD, chest pain, history of COPD, hypertension, syncope, history of

parathyroid nodes, high calcium, anxiety, depression being followed by Dr. Slaughter in

the outpatient setting apparently was admitted with confusion and some change in mental

status.  She apparently went for dinner, ate a steak and some other things and

subsequently patient was confused.  The patient has also had some hallucinations and

the patient taken to [facility] and was admitted for further evaluation and

treatment.  There is no history of fever, rigors.  No headache, loss of consciousness,

seizures at this time.  The patient was found to have possible UTI with sepsis present

on admission and other labs were within normal limits.  Otherwise, the CT of the brain

was done which showed no acute process.  Covid 19 is negative.  Psychiatry saw the

patient and thought cognitive disorder and rule out dementia versus delirium.

Neurology following the patient closely.



PAST MEDICAL HISTORY:

History of CAD, COPD, hypertension, syncope, history of parathyroid nodules, history of

CAD/stent.



MEDICATIONS:

Home medications are: Norvasc 5 mg p.o. b.i.d., omeprazole 20 mg p.o. daily, melatonin

20 mg q.h.s. p.r.n., vitamin D2 50 mg p.o. Friday, Plavix 75 mg p.o. daily, Celexa 40

mg p.o. daily, Lipitor 40 mg q.h.s.



ALLERGIES:

AMBIEN.



FAMILY HISTORY:

Family history unknown.



SOCIAL HISTORY:

History of smoking, history of alcohol intake.



REVIEW OF SYSTEMS:

ENT: Diminished vision.  Diminished hearing.

CARDIOVASCULAR: No angina or palpitations.

RESPIRATORY: As mentioned earlier.

GI: As mentioned earlier.

: No dysuria.

NERVOUS SYSTEM: As mentioned earlier.

ALLERGY/IMMUNOLOGY: No asthma or hayfever.

MUSCULOSKELETAL: As mentioned earlier.

HEMATOLOGY/ONCOLOGY: No history of anemia.

ENDOCRINE: As mentioned earlier.

CONSTITUTIONAL: As mentioned earlier.

DERMATOLOGY: Negative.

RHEUMATOLOGY: Negative.

PSYCHIATRY: As mentioned earlier.



PHYSICAL EXAMINATION:

Alert and oriented times three.

Pulse 52, blood pressure 149/75, respirations 16, temperature 97.7, pulse ox 94% on

room air.

HEENT:  Conjunctivae normal.

NECK is no JVD.

CARDIOVASCULAR: S1, S2 muffled.  RESPIRATION: Breath sounds diminished in the bases.

No rhonchi. No crackles.

ABDOMEN: Soft, nontender.

LEGS are no edema. No swelling.

NERVOUS SYSTEM: Higher functions as mentioned earlier. Moves all 4 limbs. No focal

deficits.

LYMPHATICS: No lymph nodes palpable in the neck, axilla or groin.

SKIN: No ulcer, rash or bleeding.

JOINTS: No active deforming arthropathy.



LABS:

At this time shows: CBC within normal limits and glucose 119.  UA noted.



ASSESSMENT:

1. Possible urinary tract infection with sepsis.

2. Change in mental status possible acute metabolic encephalopathy.

3. History of coronary artery disease.

4. History of chest pain.

5. History of chronic obstructive pulmonary disease.

6. Hypertension.

7. History of syncope.

8. Hypothyroidism.

9. History of parathyroid nodule and  hypercalcemia.

10.History of coronary artery disease/ stent.

11.History of anxiety, depression.

12.History of nicotine dependence.

13.FULL CODE.



RECOMMENDATIONS AND DISCUSSION:

In this 75-year-old woman who presented with multiple complex medical issues, we will

monitor the patient closely, continue the current medications, management and

symptomatic treatment. We will initiate broad-spectrum IV antibiotics and I would also

obtain a complete neurovascular workup.  Follow closely with Neurology and Psychiatry.

Resume the home medications.  Prognosis guarded because of multiple complex medical

issues. Further recommendations to follow. A copy of this dictation being forwarded to

Dr. Slaughter, who is the primary physician. Cultures will be obtained.





MMODL / IJN: [id number] / Job#: [id number]

## 2021-05-17 LAB
ANION GAP SERPL CALC-SCNC: 11.3 MMOL/L (ref 4–12)
BASOPHILS # BLD AUTO: 0.07 X 10*3/UL (ref 0–0.1)
BASOPHILS NFR BLD AUTO: 0.9 %
BUN SERPL-SCNC: 12 MG/DL (ref 9–27)
BUN/CREAT SERPL: 15 RATIO (ref 12–20)
CALCIUM SPEC-MCNC: 10 MG/DL (ref 8.7–10.3)
CHLORIDE SERPL-SCNC: 105 MMOL/L (ref 96–109)
CO2 SERPL-SCNC: 24.7 MMOL/L (ref 21.6–31.8)
EOSINOPHIL # BLD AUTO: 0.12 X 10*3/UL (ref 0.04–0.35)
EOSINOPHIL NFR BLD AUTO: 1.5 %
ERYTHROCYTE [DISTWIDTH] IN BLOOD BY AUTOMATED COUNT: 5.19 X 10*6/UL (ref 4.1–5.2)
ERYTHROCYTE [DISTWIDTH] IN BLOOD: 13.6 % (ref 11.5–14.5)
GLUCOSE SERPL-MCNC: 103 MG/DL (ref 70–110)
HCT VFR BLD AUTO: 46.6 % (ref 37.2–46.3)
HGB BLD-MCNC: 14.6 G/DL (ref 12–15)
LYMPHOCYTES # SPEC AUTO: 2.85 X 10*3/UL (ref 0.9–5)
LYMPHOCYTES NFR SPEC AUTO: 36 %
MCH RBC QN AUTO: 28.1 PG (ref 27–32)
MCHC RBC AUTO-ENTMCNC: 31.3 G/DL (ref 32–37)
MCV RBC AUTO: 89.8 FL (ref 80–97)
MONOCYTES # BLD AUTO: 0.54 X 10*3/UL (ref 0.2–1)
MONOCYTES NFR BLD AUTO: 6.8 %
NEUTROPHILS # BLD AUTO: 4.31 X 10*3/UL (ref 1.8–7.7)
NEUTROPHILS NFR BLD AUTO: 54.5 %
PLATELET # BLD AUTO: 260 X 10*3/UL (ref 140–440)
POTASSIUM SERPL-SCNC: 4.5 MMOL/L (ref 3.5–5.5)
SODIUM SERPL-SCNC: 141 MMOL/L (ref 135–145)
VIT B12 SERPL-MCNC: 361 PG/ML (ref 200–944)
WBC # BLD AUTO: 7.91 X 10*3/UL (ref 4.5–10)

## 2021-05-17 RX ADMIN — HEPARIN SODIUM SCH UNIT: 5000 INJECTION INTRAVENOUS; SUBCUTANEOUS at 08:22

## 2021-05-17 RX ADMIN — PANTOPRAZOLE SODIUM SCH MG: 40 TABLET, DELAYED RELEASE ORAL at 08:23

## 2021-05-17 RX ADMIN — THERA TABS SCH EACH: TAB at 08:23

## 2021-05-17 RX ADMIN — FOLIC ACID SCH MG: 1 TABLET ORAL at 08:23

## 2021-05-17 RX ADMIN — CEFAZOLIN SCH: 330 INJECTION, POWDER, FOR SOLUTION INTRAMUSCULAR; INTRAVENOUS at 03:57

## 2021-05-17 RX ADMIN — Medication SCH MG: at 08:23

## 2021-05-17 RX ADMIN — ATORVASTATIN CALCIUM SCH MG: 40 TABLET, FILM COATED ORAL at 20:11

## 2021-05-17 RX ADMIN — CITALOPRAM HYDROBROMIDE SCH MG: 20 TABLET ORAL at 08:22

## 2021-05-17 RX ADMIN — CLOPIDOGREL BISULFATE SCH MG: 75 TABLET ORAL at 08:23

## 2021-05-17 RX ADMIN — HEPARIN SODIUM SCH UNIT: 5000 INJECTION INTRAVENOUS; SUBCUTANEOUS at 20:11

## 2021-05-17 NOTE — ECHOF
Referral Reason:Stroke



MEASUREMENTS

--------

HEIGHT: 160.0 cm

WEIGHT: 76.7 kg

BP: 169/68

RVIDd:   2.8 cm     (< 3.3)

IVSd:   1.3 cm     (0.6 - 1.1)

LVIDd:   4.5 cm     (3.9 - 5.3)

LVPWd:   1.4 cm     (0.6 - 1.1)

IVSs:   1.7 cm

LVIDs:   3.9 cm

LVPWs:   1.4 cm

LA Diam:   3.2 cm     (2.7 - 3.8)

LAESV Index (A-L):   17.59 ml/m

Ao Diam:   3.1 cm     (2.0 - 3.7)

AV Cusp:   2.1 cm     (1.5 - 2.6)

MV EXCURSION:   17.570 mm     (> 18.000)

MV EF SLOPE:   159 mm/s     (70 - 150)

EPSS:   0.8 cm







FINDINGS

--------

This was a technically adequate study.

The left ventricular size is normal.   There is mild concentric left ventricular hypertrophy.   Overa
ll left ventricular systolic function is mildly impaired with, an EF between 45 - 50 %.

The right ventricle is normal in size.

Normal LA  size by volume 22+/-6 ml/m2.

The right atrium is normal in size.

Interatrial and interventricular septum intact.

There is mild aortic valve sclerosis.   Trace to mild aortic regurgitation.

Mild mitral annular calcification present.   There is trace mitral regurgitation.

The tricuspid valve appears structurally normal.

The pulmonic valve was not well visualized.

The aortic root size is normal.

Normal inferior vena cava with normal inspiratory collapse consistent with estimated right atrial pre
ssure of  5 mmHg.

There is no pericardial effusion.



CONCLUSIONS

--------

1. The left ventricular size is normal.

2. There is mild concentric left ventricular hypertrophy.

3. Overall left ventricular systolic function is mildly impaired with, an EF between 45 - 50 %.

4. There is mild aortic valve sclerosis.

5. Trace to mild aortic regurgitation.

6. Mild mitral annular calcification present.

7. There is trace mitral regurgitation.

8. There is no pericardial effusion.





SONOGRAPHER: Renetta King RDCS

## 2021-05-17 NOTE — MR
EXAMINATION TYPE: MR brain wo/w con

 

DATE OF EXAM: 5/17/2021

 

COMPARISON: CT brain 5/16/2021, MR brain 1214

 

HISTORY: altered mental status

 

TECHNIQUE: 

Multiplanar, multisequence images of the brain and brainstem is performed without and with IV contras
t, utilizing 7.5 mL intravenous Gadavist .

 

FINDINGS: Diffusion weighted images demonstrate no evidence of a recent infarct or other diffusion ab
normality.  There is no extra-axial fluid collection. There is been some progression in size and numb
er of the diffuse hyperintensities within the white matter on inversion recovery and T2-weighted sequ
ences. For reference, axial image #18 in the right frontal white matter shows a focus measures 13 mm 
in AP dimension which measured approximately 9 mm on prior exam. The ventricular system and cisternal
 spaces are normal in size and appearance.  The brain volume is age appropriate.

 

Midline structures demonstrate normal morphology.  The craniocervical junction appears within normal 
limits.  Post contrast images demonstrate no abnormal enhancement. The dural venous sinuses appear pa
tent. The visualized sinuses are clear and the globes are intact.

 

IMPRESSION: There is progressive white matter demyelination possibly due to chronic small vessel isch
emic changes, there is age-related atrophy.

## 2021-05-17 NOTE — PN
PROGRESS NOTE



DATE OF SERVICE:

05/17/2021



This 75-year-old woman who was admitted with change in mental status and confusion is

being closely monitored.  Patient has possible UTI with sepsis.  MRA showed diffuse

small-vessel ischemia also.  Neurology is following the patient.  No chest pain.  No

palpitations.  No fever.



PHYSICAL EXAMINATION:

Alert and oriented x3.  Pulse 68, blood pressure 149/60, respirations 16, temperature

98.2, pulse ox 93% on room air.

HEENT: Conjunctivae normal.

NECK: No jugular venous distention.

CARDIOVASCULAR SYSTEM:  S1, S2 muffled.

RESPIRATORY SYSTEM: Breath sounds diminished at the bases.  No rhonchi. No crackles.

ABDOMEN: Soft, non-tender.

LEGS: No edema. No swelling.

NERVOUS SYSTEM: No focal deficit.



LABS:

CBC, BMP noted.  Drug screen is negative.  UA noted.  Cultures are negative so far.



ASSESSMENT:

1. Possible acute urinary tract infection with sepsis, present on admission.

2. Change in mental status, acute metabolic encephalopathy.

3. MRA showing small-vessel ischemia.

4. History of coronary artery disease.

5. History of chest pain.

6. History of chronic obstructive pulmonary disease.

7. Hypertension.

8. History of syncope.

9. Hypothyroidism.

10.History of parathyroid nodule and hypercalcemia.

11.History of coronary artery disease, stent.

12.History of anxiety, depression.

13.History of nicotine dependence.

14.FULL CODE.



RECOMMENDATIONS AND DISCUSSION:

I recommend to continue current medications, continue with the monitoring, symptomatic

treatment. Otherwise at this time I recommend continuing with IV antibiotics.  Follow

the cultures. Follow closely with Neurology. MRA noted. A 2D echo with Doppler showed

bilateral plaque formation; possible 50% to 70% stenosis in the right internal carotid

artery also noted. Guarded prognosis. Further recommendations to follow.  Will consult

Vascular Surgery as well.





MMODL / IJN: [id number] / Job#: [id number]

## 2021-05-17 NOTE — EEG
ELECTROENCEPHALOGRAM REPORT



DATE OF SERVICE:

05/17/2021



PREAMBLE:

This is a 75-year-old female who was admitted for confusion and hallucinations.  This

EEG is performed to evaluate for any encephalopathy, rule out any epileptiform

activity.



EEG FINDINGS:

This is a 21 channel routine EEG recording in a patient utilizing 10/20 international

system with referential and bipolar montages.  Background consists of well developed,

well regulated, moderate voltage activity in 9 hertz alpha.  Background is posterior

dominant and seems to be reactive to eye opening and closing.  Photic driving response

was not seen.  Mild drowsiness was seen but deeper stages of sleep were not attained.

No focal or generalized epileptiform activity was seen.



IMPRESSION:

This is a normal awake and drowsy EEG.  No focal, lateralized, or epileptiform activity

was seen.





MMODL / IJN: [id number] / Job#: [id number]

## 2021-05-18 LAB
ALBUMIN SERPL-MCNC: 4.5 G/DL (ref 3.8–4.9)
ALBUMIN/GLOB SERPL: 2.37 G/DL (ref 1.6–3.17)
ALP SERPL-CCNC: 118 U/L (ref 41–126)
ALT SERPL-CCNC: 16 U/L (ref 8–44)
ANION GAP SERPL CALC-SCNC: 11.9 MMOL/L (ref 4–12)
AST SERPL-CCNC: 24 U/L (ref 13–35)
BASOPHILS # BLD AUTO: 0.06 X 10*3/UL (ref 0–0.1)
BASOPHILS NFR BLD AUTO: 0.7 %
BUN SERPL-SCNC: 19 MG/DL (ref 9–27)
BUN/CREAT SERPL: 17.27 RATIO (ref 12–20)
CALCIUM SPEC-MCNC: 10.1 MG/DL (ref 8.7–10.3)
CHLORIDE SERPL-SCNC: 104 MMOL/L (ref 96–109)
CO2 SERPL-SCNC: 24.1 MMOL/L (ref 21.6–31.8)
EOSINOPHIL # BLD AUTO: 0.1 X 10*3/UL (ref 0.04–0.35)
EOSINOPHIL NFR BLD AUTO: 1.2 %
ERYTHROCYTE [DISTWIDTH] IN BLOOD BY AUTOMATED COUNT: 4.99 X 10*6/UL (ref 4.1–5.2)
ERYTHROCYTE [DISTWIDTH] IN BLOOD: 13.7 % (ref 11.5–14.5)
GLOBULIN SER CALC-MCNC: 1.9 G/DL (ref 1.6–3.3)
GLUCOSE SERPL-MCNC: 145 MG/DL (ref 70–110)
HCT VFR BLD AUTO: 44.8 % (ref 37.2–46.3)
HGB BLD-MCNC: 13.9 G/DL (ref 12–15)
LYMPHOCYTES # SPEC AUTO: 2.65 X 10*3/UL (ref 0.9–5)
LYMPHOCYTES NFR SPEC AUTO: 33.1 %
MAGNESIUM SPEC-SCNC: 2.1 MG/DL (ref 1.5–2.4)
MCH RBC QN AUTO: 27.9 PG (ref 27–32)
MCHC RBC AUTO-ENTMCNC: 31 G/DL (ref 32–37)
MCV RBC AUTO: 89.8 FL (ref 80–97)
MONOCYTES # BLD AUTO: 0.51 X 10*3/UL (ref 0.2–1)
MONOCYTES NFR BLD AUTO: 6.4 %
NEUTROPHILS # BLD AUTO: 4.67 X 10*3/UL (ref 1.8–7.7)
NEUTROPHILS NFR BLD AUTO: 58.4 %
PLATELET # BLD AUTO: 225 X 10*3/UL (ref 140–440)
POTASSIUM SERPL-SCNC: 4.7 MMOL/L (ref 3.5–5.5)
PROT SERPL-MCNC: 6.4 G/DL (ref 6.2–8.2)
SODIUM SERPL-SCNC: 140 MMOL/L (ref 135–145)
WBC # BLD AUTO: 8.01 X 10*3/UL (ref 4.5–10)

## 2021-05-18 RX ADMIN — CITALOPRAM HYDROBROMIDE SCH MG: 20 TABLET ORAL at 09:01

## 2021-05-18 RX ADMIN — FOLIC ACID SCH MG: 1 TABLET ORAL at 11:13

## 2021-05-18 RX ADMIN — CEFAZOLIN SCH: 330 INJECTION, POWDER, FOR SOLUTION INTRAMUSCULAR; INTRAVENOUS at 05:35

## 2021-05-18 RX ADMIN — FOLIC ACID-PYRIDOXINE-CYANOCOBALAMIN TAB 2.5-25-2 MG SCH EACH: 2.5-25-2 TAB at 10:38

## 2021-05-18 RX ADMIN — PANTOPRAZOLE SODIUM SCH MG: 40 TABLET, DELAYED RELEASE ORAL at 07:18

## 2021-05-18 RX ADMIN — CLOPIDOGREL BISULFATE SCH MG: 75 TABLET ORAL at 09:00

## 2021-05-18 RX ADMIN — HEPARIN SODIUM SCH UNIT: 5000 INJECTION INTRAVENOUS; SUBCUTANEOUS at 09:01

## 2021-05-18 RX ADMIN — THERA TABS SCH EACH: TAB at 11:13

## 2021-05-18 RX ADMIN — Medication SCH MG: at 11:13

## 2021-05-18 NOTE — P.CRDCN
History of Present Illness


History of present illness: 


This is a 75-year-old woman with medical history of coronary artery disease s/p 

PCI to proximal LAD in 2015, hypertension, dyslipidemia, chronic total 

occlusion of the RCA, tobacco dependence, underlying dementia. She follows in 

the office with Dr. Echevarria.  We are being consulted for possible arrhythmia.  

Patient presented emergency department on the 2021 for altered mental 

status.  Per chart review, patient did have episodes of visual hallucinations, 

she has told other providers that her best friend was going to kill her. She 

states when she presented emergency room she does remember having symptoms of 

heart racing.  She is unable to really tell me who brought her to the emergency 

department.  She denies having any chest pain, shortness of breath, 

lightheadedness or dizziness prior to this admission to the emergency 

department.





Patient last saw Dr. Echevarria in the office last week on 2021 for a follow up 

visit with her daughter. At that time it was noticed taht her heart rate had 

been on the slow slide, stating that it has been in the 30s-40s at times. But 

this was also accompanied by some confusion and change in mental status. At this

visit, a 24hour holter monitor was ordered to be completed. These results are 

not scanned in yet.  Patient smokes 1 PPD. Denies alcohol use. Denies history of

diabetes or stroke.





Patient seen and examined at bedside, no acute distress.  Sitting up at the edge

and fell.  She is alert and oriented 3.  Per nursing overnight the patient was 

very agitated, and requiring one dose of 5 mg IM Haldol. Psychiatry and 

Neurology are following the patient.  Blood pressure 144/70, heart rate 55, 

afebrile, maintaining oxygen saturations 96% on room air.  Patient is not on 

cardiac telemetry, reviewing vital signs patient's heart rate ranges from 50-90.





DIAGNOSTICS


EKG reveals sinus rhythm, heart rate 65, marked sinus arrhythmia, no significant

STT wave abnormalities.


Echocardiogram on 21EF 45-50%, mild aortic regurgitation, trace mitral 

regurgitation. 


EEG- Patient with no focal, lateralized or epileptiform activity 


Chest xray -heart is mildly enlarged, osteopenia, calcified granuloma at the 

periphery of the right lung base that is stable


Brain CTnegative for acute intracranial process


Brain MRI- progressive white matter demyelination possibly due to chronic small 

vessel ischemic changes, age related atrophy 


Carotid Dopplersless than 50% stenosis in both internal carotid arteries.  

There is bilateral plaque formation.  Velocity in the right internal carotid 

artery appears increased compared to old exam.


Cardiac catheterization 2015- 60% proximal LAD, 40% proximal circumflex, 100%

distal RCA


Lexiscan 2018- Normal


Holter Monitor in 2017- Sinus rhythm baseline mechanism, no evidence of any 

ectopic events. 


Laboratory reviewed, troponin negative 1, urine tox negative, COVID-19 

negative, UA +UTI, sodium 141, potassium 4.5, serum creatinine 0.8, BUN 12, TSH 

within normal limits.


Current cardiac medications include amlodipine 5 mg twice a day, Plavix 75 mg 

daily, atorvastatin 40 mg nightly. 





REVIEW OF SYSTEMS


At the time of my exam:


CONSTITUTIONAL: Denies fever or chills.


CARDIOVASCULAR: +palpitations, Denies chest pain, shortness of breath, 

orthopnea, PND


RESPIRATORY: Denies cough. 


GASTROINTESTINAL: Denies abdominal pain, diarrhea, constipation, nausea or vom

iting.


MUSCULOSKELETAL: Denies myalgias.


NEUROLOGIC: +confusion Denies numbness, tingling, headacbe or weakness.


ENDOCRINE: Denies fatigue, weight change,  polydipsia or polyurina.


GENITOURINARY: Denies burning, hematuria or urgency with micturation.


HEMATOLOGIC: Denies history of anemia or bleeding. 





PHYSICAL EXAMINATION


CONSTITUTIONAL: No apparent distress. 


HEENT: Head is normocephalic. Pupils are equal, round. Sclerae anicteric. Mucous

membranes of the mouth are moist.  No JVD. No carotid bruit.


CHEST EXAMINATION: Lungs are clear to auscultation. No chest wall tenderness is 

noted on palpation or with deep breathing. 


HEART EXAMINATION: Regular rate and rhythm. S1, S2 heard. No murmurs, gallops or

rub.


ABDOMEN: Soft, nontender. Positive bowel sounds.


EXTREMITIES: 2+ peripheral pulses, no lower extremity edema and no calf 

tenderness.


NEUROLOGIC EXAMINATION: Patient is awake, alert and oriented x3. 





ASSESSMENT


Altered mental status 


History of coronary artery disease s/p PCI to proxmial LAD in 2015


Hypertension


Dyslipidemia


Tobacco dependence 





PLAN


-Patient was on cardiac telemetry this morning through this afternoon, no 

arrhythmia noted. 


-Patient recently had a 24-Holter monitor in the office with Dr. Echevarria last week 

on 21, these results are pending


-From cardiology perspective, patient can be discharged home and follow up with 

Dr. Echevarria in 1 week. 





Nurse Practitioner note has been reviewed, I agree with a documented findings 

and plan of care.  Patient was seen and examined.











Past Medical History


Past Medical History: Coronary Artery Disease (CAD), Chest Pain / Angina, COPD, 

Hypertension, Syncope, Thyroid Disorder


Additional Past Medical History / Comment(s): parathyroid nodules, high calcium


Last Myocardial Infarction Date:: 2015


History of Any Multi-Drug Resistant Organisms: None Reported


Past Surgical History: Heart Catheterization With Stent


Additional Past Surgical History / Comment(s): cyst removed from back, heart 

cath with a stent placed 2015. parathyroid removal


Past Anesthesia/Blood Transfusion Reactions: No Reported Reaction


Date of Last Stent Placement:: 2015


Past Psychological History: Anxiety, Depression


Smoking Status: Current some day smoker


Past Alcohol Use History: Occasional, Rare


Past Drug Use History: None Reported





- Past Family History


  ** Father


Family Medical History: No Reported History


Additional Family Medical History / Comment(s): FATHER  AT AGE 70. PT UNSURE

WHAT EXACTLY HE PASSED AWAY FROM.





  ** Mother


Family Medical History: Congestive Heart Failure (CHF)


Additional Family Medical History / Comment(s): MOTHER  AT AGE 91 YRS.





Medications and Allergies


                                Home Medications











 Medication  Instructions  Recorded  Confirmed  Type


 


amLODIPine BESYLATE [Norvasc] 5 mg PO BID 16 History


 


Citalopram Hydrobromide [CeleXA] 40 mg PO DAILY 17 History


 


Omeprazole 20 mg PO DAILY 17 History


 


Clopidogrel [Plavix] 75 mg PO DAILY #30 tab 17 Rx


 


Atorvastatin [Lipitor] 40 mg PO HS 21 History


 


Ergocalciferol [Vitamin D2 (1250 1,250 mcg PO FR 21 History





Mcg = 33611 Iu)]    


 


Melatonin 20 mg PO HS PRN 21 History








                                    Allergies











Allergy/AdvReac Type Severity Reaction Status Date / Time


 


zolpidem tartrate AdvReac Severe Hallucinati Verified 21 09:40





[From Ambien]   ons  














Physical Exam


Vitals: 


                                   Vital Signs











  Temp Pulse Resp BP Pulse Ox


 


 21 07:52      96


 


 21 01:34  97.9 F  89  22  167/68  96


 


 21 20:00    22  


 


 21 19:40  98.1 F  58 L  18  163/79  95


 


 21 15:00  98.2 F  68  16  149/67  93 L


 


 21 08:00      96








                                Intake and Output











 21





 22:59 06:59 14:59


 


Other:   


 


  Voiding Method Toilet  Toilet


 


  # Voids 1 2 














Results





                                 21 05:24





                                 21 04:30


                                       CBC











  21 Range/Units





  04:30 


 


WBC  7.91  (4.50-10.00)  X 10*3/uL


 


RBC  5.19  (4.10-5.20)  X 10*6/uL


 


Hgb  14.6  (12.0-15.0)  g/dL


 


Hct  46.6 H  (37.2-46.3)  %


 


Plt Count  260  (140-440)  X 10*3/uL








                          Comprehensive Metabolic Panel











  21 Range/Units





  04:30 


 


Sodium  141  (135-145)  mmol/L


 


Potassium  4.5  (3.5-5.5)  mmol/L


 


Chloride  105  ()  mmol/L


 


Carbon Dioxide  24.7  (21.6-31.8)  mmol/L


 


BUN  12.0  (9.0-27.0)  mg/dL


 


Creatinine  0.8  (0.6-1.5)  mg/dL


 


Glucose  103  ()  mg/dL


 


Calcium  10.0  (8.7-10.3)  mg/dL








                               Current Medications











Generic Name Dose Route Start Last Admin





  Trade Name Freq  PRN Reason Stop Dose Admin


 


Acetaminophen  650 mg  21 04:08 





  Acetaminophen Tab 325 Mg Tab  PO  





  Q6HR PRN  





  Mild Pain or Fever > 100.5  


 


Alprazolam  0.25 mg  21 14:00 





  Alprazolam 0.25 Mg Tab  PO  





  TID PRN  





  Anxiety  


 


Amlodipine Besylate  5 mg  21 21:00  21 20:11





  Amlodipine 5 Mg Tab  PO   5 mg





  BID YUNI   Administration


 


Atorvastatin Calcium  40 mg  21 21:00  21 20:11





  Atorvastatin 40 Mg Tab  PO   40 mg





  HS YUNI   Administration


 


Citalopram Hydrobromide  40 mg  21 09:00  21 08:22





  Citalopram Hydrobromide 20 Mg Tab  PO   40 mg





  DAILY YUNI   Administration


 


Clopidogrel Bisulfate  75 mg  21 14:15  21 08:23





  Clopidogrel 75 Mg Tab  PO   75 mg





  DAILY YUNI   Administration


 


Ergocalciferol  1,250 mcg  21 09:00 





  Ergocalciferol 1,250 Mcg (50,000 Iu) Capsule  PO  





  FR YUNI  


 


Folic Acid  1 mg  21 12:00  21 08:23





  Folic Acid 1 Mg Tab  PO   1 mg





  DAILY@1200 YUNI   Administration


 


Haloperidol Lactate  5 mg  21 02:00  21 02:10





  Haloperidol Lactate 5 Mg/Ml 1 Ml Vial  IM   5 mg





  Q6HR PRN   Administration





  Agitation or Acute Psychosis  


 


Heparin Sodium (Porcine)  5,000 unit  21 14:15  21 20:11





  Heparin Sodium,Porcine/Pf 5,000 Unit/0.5 Ml Syringe  SQ   5,000 unit





  Q12HR YUNI   Administration


 


Sodium Chloride  1,000 mls @ 20 mls/hr  21 04:15  21 05:35





  Saline 0.9%  IV   Not Given





  .Q24H YUNI  


 


Ceftriaxone Sodium 1 gm/  50 mls @ 100 mls/hr  21 10:45  21 08:23





  Sodium Chloride  IVPB   100 mls/hr





  Q24HR YUNI   Administration


 


Lorazepam  0.5 mg  21 02:01 





  Lorazepam 2 Mg/Ml Inj  IV  





  Q4HR PRN  





  Anxiety  


 


Multivitamins  1 each  21 12:00  21 08:23





  Multivitamins, Thera 1 Each Tab  PO   1 each





  DAILY@1200 YUNI   Administration


 


Naloxone HCl  0.2 mg  21 04:08 





  Naloxone 0.4 Mg/Ml 1 Ml Vial  IV  





  Q2M PRN  





  Opioid Reversal  


 


Pantoprazole Sodium  40 mg  21 07:30  21 07:18





  Pantoprazole 40 Mg Tablet  PO   40 mg





  AC-BRKFST YUNI   Administration


 


Thiamine HCl  100 mg  21 12:00  21 08:23





  Thiamine 100 Mg Tab  PO   100 mg





  DAILY@1200 YUNI   Administration








                                Intake and Output











 2121





 22:59 06:59 14:59


 


Other:   


 


  Voiding Method Toilet  Toilet


 


  # Voids 1 2 








                                        





                                 21 04:30 





                                 21 04:30

## 2021-05-18 NOTE — P.PN
Subjective





This is a pleasant 75 years old female with multiple medical problems presents 

with.  So confusion and suspected to have acute urinary tract infection, she was

admitted and treated with ceftriaxone, with neurology and vascular surgery team 

were consulted.  When I saw the patient she was alert and awake and call him, 

however later on during the day she become agitated so patient came to the 

hospital for close monitoring for now.  Patient got 1 dose of 15 mg temazepam 

yesterday night.  Patient has been as was a stopped.  Because it might contri

bute to her delirium.


Patient denies any specific symptoms, no chest pain or dyspnea.  No headache.  

She is awake and alert to time, place and person, she


That she is in the hospital because of confusion.  However she denies any 

urinary tract symptoms, no dysuria, no change in frequency, she vomited once 

this morning however she denies abdominal pain.  Last bowel movement was 

yesterday and she has been constipated since then.


She is hemodynamically stable, labs were stable, creatinine is slightly went up 

0.8 up to 1.1 but still within the reference range.  She is a still on 

ceftriaxone, however patient is afebrile, no leukocytosis and no urinary 

symptoms today.  Urine culture is negative so antibiotics of Rocephin was 

stopped because patient finish her treatment and she is asymptomatic currently.


Brain MRI showing cerebral atrophy.  EEG is normal.  Ejection fraction 40-45%.  

Pronecalcitonin only slightly up at 0.10.


When I talked to urology service today we were cleared the patient for discharge

however later on patient got more confused and we kept the patient.


Patient will need to follow-up with Dr. Echevarria in one week to follow-up with her 

Holter monitor, cardiologist already evaluated the patient.  Physical therapy 

service requested








Objective





- Vital Signs


Vital signs: 


                                   Vital Signs











Temp  97.7 F   05/18/21 07:00


 


Pulse  55 L  05/18/21 07:00


 


Resp  18   05/18/21 07:00


 


BP  144/70   05/18/21 07:00


 


Pulse Ox  96   05/18/21 07:52








                                 Intake & Output











 05/17/21 05/18/21 05/18/21





 18:59 06:59 18:59


 


Intake Total 50  


 


Balance 50  


 


Intake:   


 


  Intake, IV Titration 50  





  Amount   


 


    cefTRIAXone 1 gm In 50  





    Sodium Chloride 0.9% 50   





    ml @ 100 mls/hr IVPB   





    Q24HR [facility] Rx#:[id number]   


 


Other:   


 


  Voiding Method  Toilet Toilet


 


  # Voids 1 2 














- Exam





GENERAL: The patient is alert and oriented x3, not in any acute distress. Well 

developed, well nourished. 


HEENT: Pupils are round and equally reacting to light. EOMI. No scleral icterus.

No conjunctival pallor. Normocephalic, atraumatic. No pharyngeal erythema. No 

thyromegaly. 


CARDIOVASCULAR: S1 and S2 present. No murmurs, rubs, or gallops. 


PULMONARY: Chest is clear to auscultation, no wheezing or crackles. 


ABDOMEN: Soft, nontender, nondistended, normoactive bowel sounds. No palpable 

organomegaly. 


MUSCULOSKELETAL: No joint swelling or deformity. 


EXTREMITIES: No cyanosis, clubbing, or pedal edema. 


NEUROLOGICAL: Gross neurological examination did not reveal any focal deficits. 


SKIN: No rashes. no petechiae.





- Labs


CBC & Chem 7: 


                                 05/18/21 05:24





                                 05/18/21 05:24


Labs: 


                  Abnormal Lab Results - Last 24 Hours (Table)











  05/18/21 05/18/21 05/18/21 Range/Units





  05:24 05:24 05:24 


 


MCHC   31.0 L   (32.0-37.0)  g/dL


 


MPV   12.3 H   (9.5-12.2)  fL


 


Est GFR (CKD-EPI)AfAm    56.5 L  (60.0-200.0)   


 


Est GFR (CKD-EPI)NonAf    48.7 L  (60.0-200.0)   


 


Glucose    145 H  ()  mg/dL


 


Conjugated Bilirubin    <0.20 L  (0.20-0.40)  mg/dL


 


Procalcitonin  0.10 H    (0.02-0.09)  ng/mL








                      Microbiology - Last 24 Hours (Table)











 05/16/21 10:52 Blood Culture - Preliminary





 Blood    No Growth after 48 hours


 


 05/16/21 11:39 Urine Culture - Final





 Urine,Voided 














Assessment and Plan


Assessment: 





Periods of altered mental status secondary to metabolic and toxic encephalopathy


Acute urinary tract infection, finished therapy and antibiotics were 

discontinued, keep monitoring while off antibiotics


Possible right internal carotid artery stenosis less than 50% however velocity 

showing normal and 50-70%














Plan: 





This is a pleasant 75 years old female who presents with encephalopathy and 

patient finished treatment for UTI.  Discontinue antibiotic.  Urine culture is 

negative.


We'll keep monitoring.  Neurology service on the case.


Discontinue benzodiazepine including temazepam.  Give 1 dose of Seroquel.  Keep 

monitoring mental status closely


Labs and medication were reviewed..  Continue same treatment.  Continue with 

symptomatic treatment.  Resume home medication.  Monitor lytes and vitals.  DVT 

and GI prophylaxis.  Further recommendationsas per clinical course of the 

patient


DVT prophylaxis: Subcutaneous heparin


GI Prophylaxis: Ppi


PT/OT: Pending


Prognosis is guarded

## 2021-05-18 NOTE — P.PN
Progress Note - Text


Progress Note Date: 05/18/21





Interval History:


Patient was seen today for psychiatric follow-up regarding patient's delirium.  

Patient was initially seen by Dr. Crane over the weekend for altered mental 

status.  At that time patient was confused paranoid and a poor historian.  

Diagnosis was likely delirium possibly underlying dementia.  Patient had a MRI 

on 5/17 showed progressive white matter demyelination due to likely chronic 

small vessel ischemia and age-related atrophy.  Patient has neurology following 

along.  Patient was noticed to have a UTI which is currently being treated with 

antibiotics.  Nursing.  Patient states that she is doing better compared to 

yesterday and more cooperative however patient did receive a Haldol IM shot this

morning for agitation.  Patient's daughter spoke to writer outside her room and 

states that she is doing a bit better today however was sleepy today.  Daughter 

also claims that patient lives alone independently and does everything on her 

own including cocaine cleaning and finances.  She states that she checks on her 

daily and denies patient having any underlying dementia.  Patient was seen at 

the bedside and was sitting in her chair.  She was responsive to writer and 

cooperative.  Patient did not show much confusion at all and could identify 

objects in the room and had fair concentration.  She spoke about her living 

situation.  She claims that she was admitted to the hospital for being "confused

and thinking someone was going to kill me" however now states that "that's all 

wrong I don't believe that stuff".  She states that she denies any depression or

an anxiety.  She was alert and oriented 3 and knows the current Pres. is. At 

this time patient denies any suicidal or homical ideations, intent or plan. 

Patient denies any auditory, visual hallucinations and denies any paranoia or 

delusions. Patient denies any side effects from the medications and has been c

ompliant with meds. 





Mental Status Exam:


General Appearance: Patient appears to be elderly, stated age is alert, 

directable, and cooperative. 


Behavior: Patient is calmly seated without any agitated behavior.  Cooperative


Speech: Patient's speech is fluent and nonpressured. 


Mood/Affect: Mood is improving mildly, affect is congruent and constricted. 


Suicidality/Homicidality:  Patient denies having any suicidal or homicidal 

ideation intent or plan.  


Perceptions: Patient denies any visual hallucinations and denies any auditory 

hallucinations  


Though content/process: There is no evidence of any delusional thought content 

and thought process is linear and goal-directed. 


Memory and concentration: AOX3, grossly intact for the purposes of this session.

 Fair attention span.  Can identify objects in the room.  Can spell "world" 

backwards


Judgment and insight: Improving mildly





Assessment


Delirium likely secondary to infection (uti)


History of depression





Plan:


-At this time patient DOES NOT meet criteria for inpatient psychiatric 

admission.


-Delirium precautions recommended with patient including - avoiding use of 

narcotics and CNS sedatives, limit anticholinergic medications when possible, 

frequent re-orientation, minimize use of restraints, open window shades during 

the day and close them at night


-Would recommend the following medication changes/additions: Continue with her 

home dose of Celexa. Discontinued Xanax and any other benzodiazepines as this 

may contribute to worsening delirium and confusion and possibly falls in elderly

patients. Haldol prn for agitation


- to provide patient with outpatient mental health/psychiatry 

resources for appropriate follow up upon discharge


-Communicated plan to patient's nurse


-Psychiatry will sign off at this time


-Please contact with any questions.

## 2021-05-18 NOTE — P.GSCN
History of Present Illness


Consult date: 21


Reason for Consult: 





carotid stenosis


Requesting physician: [place] E Sheet


History of present illness: 





This is a pleasant 75-year-old  female with a past medical history of 

coronary artery disease, hypertension who presented to the emergency department 

in 2 days ago for altered mental status.  Apparently the patient was having 

visual hallucinations a week ago who is  for the past year.  Patient 

also was having hallucinations yesterday evening as well, however she states she

does not remember.  She was seen by neurology who ordered an EEG and MRI of the 

brain.  Her initial computed tomography scan reported no acute intracranial 

process.,  However neurology reviewed the CT and felt the patient had mild to 

moderate bilateral frontal atrophy which he stated slightly more than 

appropriate for her age.  The patient denies any focal deficits, denies any 

weakness to her upper or lower extremities.  She is alert and answering 

questions appropriately at this time.  Denies any shortness of breath, chest 

pain, headache, abdominal pain, nausea vomiting, or chills.  He has part of 

further workup she underwent a carotid ultrasound which showed no significant 

internal carotid artery stenosis bilaterally, they're worse velocity measurement

suggesting 50-70% stenosis in the right internal carotid artery.





Review of Systems





A 14 point review of systems was completed all pertinent positives and negatives

as stated in the HPI





Past Medical History


Past Medical History: Coronary Artery Disease (CAD), Chest Pain / Angina, COPD, 

Hypertension, Syncope, Thyroid Disorder


Additional Past Medical History / Comment(s): parathyroid nodules, high calcium


Last Myocardial Infarction Date:: 2015


History of Any Multi-Drug Resistant Organisms: None Reported


Past Surgical History: Heart Catheterization With Stent


Additional Past Surgical History / Comment(s): cyst removed from back, heart 

cath with a stent placed 2015. parathyroid removal


Past Anesthesia/Blood Transfusion Reactions: No Reported Reaction


Date of Last Stent Placement:: 2015


Past Psychological History: Anxiety, Depression


Smoking Status: Current some day smoker


Past Alcohol Use History: Occasional, Rare


Past Drug Use History: None Reported





- Past Family History


  ** Father


Family Medical History: No Reported History


Additional Family Medical History / Comment(s): FATHER  AT AGE 70. PT UNSURE

WHAT EXACTLY HE PASSED AWAY FROM.





  ** Mother


Family Medical History: Congestive Heart Failure (CHF)


Additional Family Medical History / Comment(s): MOTHER  AT AGE 91 YRS.





Medications and Allergies


                                Home Medications











 Medication  Instructions  Recorded  Confirmed  Type


 


amLODIPine BESYLATE [Norvasc] 5 mg PO BID 16 History


 


Citalopram Hydrobromide [CeleXA] 40 mg PO DAILY 17 History


 


Omeprazole 20 mg PO DAILY 17 History


 


Clopidogrel [Plavix] 75 mg PO DAILY #30 tab 17 Rx


 


Atorvastatin [Lipitor] 40 mg PO HS 21 History


 


Ergocalciferol [Vitamin D2 (1250 1,250 mcg PO FR 21 History





Mcg = 02810 Iu)]    


 


Melatonin 20 mg PO HS PRN 21 History








                                    Allergies











Allergy/AdvReac Type Severity Reaction Status Date / Time


 


zolpidem tartrate AdvReac Severe Hallucinati Verified 21 09:40





[facility]   [facility]  














Surgical - Exam


                                   Vital Signs











Pulse Resp BP Pulse Ox


 


 72   19   156/84   97 


 


 21 00:55  21 00:55  21 00:55  21 00:55














General appearance: The patient is alert, oriented, appears in no acute distr

ess.


HET: Head is normocephalic and atraumatic. 


Neck: Supple without lymphadenopathy.  Trachea midline.


Heart: S1 S2.  Regular rate and rhythm.


Lungs: Fair to auscultation.


Abdomen: Soft, nontender, nondistended.


Extremities: Normal skin color and turgor. .


Neurological: No focal deficits.  Strength and sensation are grossly intact.  

Tongue protrudes midline, she has facial symmetry.  She answers questions 

appropriately and follows commands.





Results





- Labs





                                 21 05:24





                                 21 04:30


                  Abnormal Lab Results - Last 24 Hours (Table)











  21 Range/Units





  05:24 


 


MCHC  31.0 L  (32.0-37.0)  g/dL


 


MPV  12.3 H  (9.5-12.2)  fL








                      Microbiology - Last 24 Hours (Table)











 21 10:52 Blood Culture - Preliminary





 Blood    No Growth after 24 hours


 


 21 11:39 Urine Culture - Final





 Urine,Voided 














- Imaging


Additional studies: 





Carotid ultrasound: Antegrade flow in the vertebral arteries.  Images in 

measurement suggests less than 50% stenosis in both internal carotid arteries.  

Bilateral plaque formation.  Velocity in the right internal carotid artery 

appears increased compared to old exam and suggest increased plaque.  The images

show less than 50% stenosis but the velocity measurements suggest 50-70% 

stenosis in the right internal carotid artery.





CT head: No acute intracranial process





MRA brain: There is progressive white matter demyelination possibly due to 

chronic small vessel ischemic changes, there is age-related atrophy





EEG: This is a normal awake and drowsy EEG.  No focal, lateralized, or 

epileptiform activity seen.





Assessment and Plan


Assessment: 





1.  Internal carotid artery stenosis less than 50%


2.  Altered mental status changes


3.  Urinary tract infection


4.  History hypertension


5.  History hyperlipidemia


6.  History of coronary artery disease


Plan: 





1.  Agree with continuing Plavix and Lipitor


2.  There is no significant internal carotid artery stenosis, therefore there is

no indication for any vascular surgical intervention at this time


3.  Discussed with patient to follow-up with vascular surgery to be followed for

carotid stenosis on an outpatient basis





Thank you for this consultation allowing us take part in the plan of care of 

your patient during her hospital stay.  We'll sign off at this time.





The impression and plan of care has been dictated as directed.








I performed a history and examination of this patient,  discussed the same with 

the dictator.  I agree with the dictator's note ,documented as a scribe.  Any 

additional findings or plans will be noted.

## 2021-05-18 NOTE — P.PN
Subjective


Progress Note Date: 21





Patient was seen for a follow-up.  Patient initially seen by Dr. Elfego Koroma.  

Please refer to his note for details.





Patient was admitted for altered mental status of 1 week duration with visual 

hallucination.  Some concern about underlying cognitive impairment and delirium.

 Patient states that she wants to take shower as she is noticing "smell".  She 

states "nothing going on, can't believe what is happening to her".  She states 

that her brother is talking across the titus.  She still slightly delusional.





Objective





- Vital Signs


Vital signs: 


                                   Vital Signs











Temp  98.2 F   21 15:00


 


Pulse  68   21 15:00


 


Resp  16   21 15:00


 


BP  149/67   21 15:00


 


Pulse Ox  93 L  21 15:00








                                 Intake & Output











 21





 18:59 06:59 18:59


 


Intake Total   50


 


Balance   50


 


Intake:   


 


  Intake, IV Titration   50





  Amount   


 


    cefTRIAXone 1 gm In   50





    Sodium Chloride 0.9% 50   





    ml @ 100 mls/hr IVPB   





    Q24HR [facility] Rx#:[id number]   


 


Other:   


 


  Voiding Method  Toilet 


 


  # Voids 3 2 1














- Exam





On examination patient is alert and awake in no distress.  She knows it is May 

2021 and that she is in [facility] in [place].  Speech and 

language functions are normal.  Attention, concentration, fund of knowledge is 

adequate.  Cranial nerves are normal.  Muscle strength is normal.  No ataxia.  

Tone and bulk of muscles normal.  Gait appears normal.





- Labs


CBC & Chem 7: 


                                 21 04:30





                                 21 04:30


Labs: 


                  Abnormal Lab Results - Last 24 Hours (Table)











  21 Range/Units





  04:30 


 


Hct  46.6 H  (37.2-46.3)  %


 


MCHC  31.3 L  (32.0-37.0)  g/dL








                      Microbiology - Last 24 Hours (Table)











 21 10:52 Blood Culture - Preliminary





 Blood    No Growth after 24 hours


 


 21 11:39 Urine Culture - Final





 Urine,Voided 














Assessment and Plan


Assessment: 





Altered mental status for the past one weeks (having visual hallucination of her

son who is  for the past one year and having thought that her friend is 

going to harm her in the past couple month): Possibly delirium (especially with 

underlying UTI).  Rule out underlying cognitive impairment/dementia


Mild encephalopathy from underlying UTI


Acute urinary tract infection


Calcified granuloma at the periphery of the right lung base Per CXR


Plan: 





* Patient continues to use to be slightly delusional.  Psychiatry following the 

  patient.


* Vitamin B12 361, folate 7.8, TSH normal 2.87, methylmalonic acid, Vitamin B6 

  level and thiamine level are still pending.  We will start B12 and folate as 

  they are on the lower limits of normal range.


* MRI of the brain w/ and w/o revealed progressive white matter demyelination 

  possibly due to chronic small vessel ischemic disease.  There is age-related 

  atrophy.  No acute process.  


* EEG normal awake and drowsy.  No epileptiform activity seen.  


* 2-D echo showed normal left-ventricular size, mild concentric LVH, EF is 

  mildly impaired 45-50%.  Mild aortic valve sclerosis.


* Carotid Doppler revealed antegrade flow in the vertebral arteries.  Less than 

  50% stenosis in both ICAs.  Bilateral plaque formation.  Right ICA has 

  increased velocity.  The images showed less than 50% stenosis but the velocity

  measurements suggest 50-70% stenosis of the right ICA.  Continue Plavix 75 mg,

  Lipitor 40 mg.


* Appreciate psychiatric input.  Impression is cognitive disorder, not otherwise

  specified and rule out dementia versus delirium.  Collateral history was r

  ecommended.  It was also felt the patient cannot live on her own by herself 

  because she is risk to herself.


* Recommend patient to follow-up with a neurologist as outpatient for further 

  workup of cognitive impairment/dementia within 1-2 weeks.  Patient currently 

  on Celexa 40 mg, Haldol when necessary, Xanax when necessary and Ativan when 

  necessary.  Would suggest decreasing benzodiazepine as the benzodiazepines can

  make delirium worse.


* Defer the rest of medical management to the primary team.

## 2021-05-18 NOTE — P.PN
Subjective


Progress Note Date: 21: Patient offers no new complaints.  Wants to go home.  No visual 

hallucinations reported.  Spoke to Dr. stewart.





2021 Patient was seen for a follow-up.  Patient initially seen by Dr. Elfego Koroma.  Please refer to his note for details.





Patient was admitted for altered mental status of 1 week duration with visual 

hallucination.  Some concern about underlying cognitive impairment and delirium.

 Patient states that she wants to take shower as she is noticing "smell".  She 

states "nothing going on, can't believe what is happening to her".  She states 

that her brother is talking across the titus.  She still slightly delusional.





Objective





- Vital Signs


Vital signs: 


                                   Vital Signs











Temp  97.7 F   21 07:00


 


Pulse  55 L  21 07:00


 


Resp  18   21 07:00


 


BP  144/70   21 07:00


 


Pulse Ox  96   21 07:52








                                 Intake & Output











 21





 18:59 06:59 18:59


 


Intake Total 50  


 


Balance 50  


 


Intake:   


 


  Intake, IV Titration 50  





  Amount   


 


    cefTRIAXone 1 gm In 50  





    Sodium Chloride 0.9% 50   





    ml @ 100 mls/hr IVPB   





    Q24HR [facility] Rx#:[id number]   


 


Other:   


 


  Voiding Method  Toilet Toilet


 


  # Voids 1 2 














- Exam





On examination patient is alert and awake in no distress.  She knows it is May 

2021 and that she is in [facility] in [place].  Patient knows

name of the current president.  Speech and language functions are normal.  

Attention, concentration, fund of knowledge is adequate.  Cranial nerves are 

normal.  Muscle strength is normal.  No ataxia.  Tone and bulk of muscles 

normal.  Gait appears normal.





- Labs


CBC & Chem 7: 


                                 21 05:24





                                 21 05:38


Labs: 


                  Abnormal Lab Results - Last 24 Hours (Table)











  21 Range/Units





  05:24 


 


MCHC  31.0 L  (32.0-37.0)  g/dL


 


MPV  12.3 H  (9.5-12.2)  fL








                      Microbiology - Last 24 Hours (Table)











 21 10:52 Blood Culture - Preliminary





 Blood    No Growth after 24 hours


 


 21 11:39 Urine Culture - Final





 Urine,Voided 














Assessment and Plan


Assessment: 





Altered mental status for the past one weeks (having visual hallucination of her

son who is  for the past one year and having thought that her friend is 

going to harm her in the past couple month): Possibly delirium (especially with 

underlying UTI).  Rule out underlying cognitive impairment/dementia


Mild encephalopathy from underlying UTI


Acute urinary tract infection


Calcified granuloma at the periphery of the right lung base Per CXR


Plan: 





* Patient continues to use to be slightly delusional.  Psychiatry following the 

  patient.


* Vitamin B12 361, folate 7.8, TSH normal 2.87, methylmalonic acid, Vitamin B6 

  level and thiamine level are still pending.  We will start B12 and folate as 

  they are on the lower limits of normal range.


* MRI of the brain w/ and w/o revealed progressive white matter demyelination 

  possibly due to chronic small vessel ischemic disease.  There is age-related 

  atrophy.  No acute process.  


* EEG normal awake and drowsy.  No epileptiform activity seen.  


* 2-D echo showed normal left-ventricular size, mild concentric LVH, EF is 

  mildly impaired 45-50%.  Mild aortic valve sclerosis.


* Carotid Doppler revealed antegrade flow in the vertebral arteries.  Less than 

  50% stenosis in both ICAs.  Bilateral plaque formation.  Right ICA has 

  increased velocity.  The images showed less than 50% stenosis but the velocity

  measurements suggest 50-70% stenosis of the right ICA.  Continue Plavix 75 mg,

  Lipitor 40 mg.


* Appreciate psychiatric input.  Impression is cognitive disorder, not otherwise

  specified and rule out dementia versus delirium.  Collateral history was 

  recommended.  It was also felt the patient cannot live on her own by herself 

  because she is risk to herself.


* Recommend patient to follow-up with a neurologist as outpatient for further 

  workup of cognitive impairment/dementia within 1-2 weeks.  Patient currently 

  on Celexa 40 mg, Haldol when necessary, Xanax when necessary and Ativan when 

  necessary.  Would suggest decreasing benzodiazepine as the benzodiazepines can

  make delirium worse.


* Defer the rest of medical management to the primary team.


* Neurologically clear for discharge.  Discussed with Dr. Stewart.

## 2021-05-19 LAB
ANION GAP SERPL CALC-SCNC: 12.2 MMOL/L (ref 4–12)
BUN SERPL-SCNC: 19 MG/DL (ref 9–27)
BUN/CREAT SERPL: 19 RATIO (ref 12–20)
CALCIUM SPEC-MCNC: 10 MG/DL (ref 8.7–10.3)
CHLORIDE SERPL-SCNC: 105 MMOL/L (ref 96–109)
CO2 SERPL-SCNC: 23.8 MMOL/L (ref 21.6–31.8)
GLUCOSE SERPL-MCNC: 106 MG/DL (ref 70–110)
POTASSIUM SERPL-SCNC: 4.6 MMOL/L (ref 3.5–5.5)
SODIUM SERPL-SCNC: 141 MMOL/L (ref 135–145)

## 2021-05-19 RX ADMIN — Medication SCH MG: at 12:01

## 2021-05-19 RX ADMIN — FOLIC ACID SCH MG: 1 TABLET ORAL at 12:01

## 2021-05-19 RX ADMIN — PANTOPRAZOLE SODIUM SCH MG: 40 TABLET, DELAYED RELEASE ORAL at 07:57

## 2021-05-19 RX ADMIN — CITALOPRAM HYDROBROMIDE SCH MG: 20 TABLET ORAL at 08:11

## 2021-05-19 RX ADMIN — ATORVASTATIN CALCIUM SCH MG: 40 TABLET, FILM COATED ORAL at 21:29

## 2021-05-19 RX ADMIN — THERA TABS SCH EACH: TAB at 12:01

## 2021-05-19 RX ADMIN — CLOPIDOGREL BISULFATE SCH MG: 75 TABLET ORAL at 08:05

## 2021-05-19 RX ADMIN — FOLIC ACID-PYRIDOXINE-CYANOCOBALAMIN TAB 2.5-25-2 MG SCH EACH: 2.5-25-2 TAB at 08:11

## 2021-05-19 RX ADMIN — HEPARIN SODIUM SCH: 5000 INJECTION INTRAVENOUS; SUBCUTANEOUS at 08:48

## 2021-05-19 RX ADMIN — CEFAZOLIN SCH: 330 INJECTION, POWDER, FOR SOLUTION INTRAMUSCULAR; INTRAVENOUS at 05:42

## 2021-05-19 RX ADMIN — HEPARIN SODIUM SCH: 5000 INJECTION INTRAVENOUS; SUBCUTANEOUS at 00:10

## 2021-05-19 RX ADMIN — ATORVASTATIN CALCIUM SCH MG: 40 TABLET, FILM COATED ORAL at 00:32

## 2021-05-19 RX ADMIN — PYRIDOXINE HCL TAB 50 MG SCH MG: 50 TAB at 13:57

## 2021-05-19 RX ADMIN — HEPARIN SODIUM SCH UNIT: 5000 INJECTION INTRAVENOUS; SUBCUTANEOUS at 21:30

## 2021-05-19 NOTE — P.PN
Progress Note - Text


Progress Note Date: 21





Interval History:


Patient was seen today for psychiatric follow-up regarding patient's delirium.  

Patient was seen at the side of the bed with her daughter and daughter-in-law 

visiting her.  Patient's nurse states that patient was hallucinating yesterday 

evening and was acting bizarre and speaking into her call light.  She was alert 

and oriented 3 and knows the current Pres. is.  She was denying any overnight 

complaints and states that she slept throughout the night. She has limited 

insight into her current presentation and her delirium today.  She was rambling 

at times and was fairly tangential and made some bizarre statements and even 

spoke about masturbation to writer. She is denying any depression or anxiety 

today. She was admitting to hallucinating and seeing her  and  

son.  Patient and daughters asked several questions or patient's medication and 

her condition. At this time patient denies any suicidal or homical ideations, 

intent or plan. Patient denies any auditory, visual hallucinations and denies 

any paranoia or delusions. Patient denies any side effects from the medications 

and has been compliant with meds. 





Mental Status Exam:


General Appearance: Patient appears to be elderly, stated age is alert, 

directable, and cooperative. 


Behavior: Patient is calmly seated without any agitated behavior.  Bizarre at 

times.  Inappropriate at times.


Speech: Patient's speech is fluent and nonpressured. 


Mood/Affect: Mood is improving mildly, affect is congruent and constricted. 


Suicidality/Homicidality:  Patient denies having any suicidal or homicidal 

ideation intent or plan.  


Perceptions: Patient denies any visual hallucinations and denies any auditory 

hallucinations  


Though content/process: There is no evidence of any delusional thought content 

and thought process is linear and goal-directed.  Inappropriate content at 

times.


Memory and concentration: AOX3, grossly intact for the purposes of this session.

 Fair attention span.  Can identify objects in the room.  Can spell "world" 

backwards


Judgment and insight: Limited however is Improving mildly





Assessment


Delirium likely secondary to infection (uti)


History of depression





Plan:


-At this time patient DOES NOT meet criteria for inpatient psychiatric 

admission.


-Delirium precautions recommended with patient including - avoiding use of 

narcotics and CNS sedatives, limit anticholinergic medications when possible, 

frequent re-orientation, minimize use of restraints, open window shades during 

the day and close them at night


-Would recommend the following medication changes/additions: Continue with her 

home dose of Celexa. Discontinued Xanax and any other benzodiazepines as this 

may contribute to worsening delirium and confusion and possibly falls in elderly

patients. Added scheduled Haldol 1mg BID for hallucinations/psychosis. Haldol IM

or PO prn for agitation


- to provide patient with outpatient mental health/psychiatry 

resources for appropriate follow up upon discharge


-Communicated plan to patient's nurse


-Spoke with patient and daughters at the bedside about the plan and answered 

questions/concerns


-Will follow along with patient tomorrow.


-Please contact with any questions.

## 2021-05-19 NOTE — P.PN
Subjective





This is a pleasant 75 years old female with multiple medical problems presents 

with.  So confusion and suspected to have acute urinary tract infection, she was

admitted and treated with ceftriaxone, with neurology and vascular surgery team 

were consulted.  When I saw the patient she was alert and awake and call him, 

however later on during the day she become agitated so patient came to the 

hospital for close monitoring for now.  Patient got 1 dose of 15 mg temazepam 

yesterday night.  Patient has been as was a stopped.  Because it might contri

bute to her delirium.


Patient denies any specific symptoms, no chest pain or dyspnea.  No headache.  

She is awake and alert to time, place and person, she


That she is in the hospital because of confusion.  However she denies any 

urinary tract symptoms, no dysuria, no change in frequency, she vomited once 

this morning however she denies abdominal pain.  Last bowel movement was 

yesterday and she has been constipated since then.


She is hemodynamically stable, labs were stable, creatinine is slightly went up 

0.8 up to 1.1 but still within the reference range.  She is a still on 

ceftriaxone, however patient is afebrile, no leukocytosis and no urinary 

symptoms today.  Urine culture is negative so antibiotics of Rocephin was 

stopped because patient finish her treatment and she is asymptomatic currently.


Brain MRI showing cerebral atrophy.  EEG is normal.  Ejection fraction 40-45%.  

Pronecalcitonin only slightly up at 0.10.


When I talked to urology service today we were cleared the patient for discharge

however later on patient got more confused and we kept the patient.


Patient will need to follow-up with Dr. Echevarria in one week to follow-up with her 

Holter monitor, cardiologist already evaluated the patient.  Physical therapy 

service requested





05/19/2021


Patient when I saw her yesterday morning and today morning she was completely 

alert and awake and oriented to time place and person, she immediately told me 

she is in the [facility] [place] in [place].  She knew the date 

exactly 05/19/2021 and she knew the name of the president samina.  Also she 

follows commands appropriately and she knows why she is in the hospital, periods

of confusion.  However during the day she got periods of confusion for example 

this afternoon.


Neurology and psychiatry already evaluated the patient and her workup was 

unremarkable including brain MRI, EEG, carotid duplex, echo.  Urine culture is 

negative and she finished her treatment with UTI.  


also her benzodiazepines were stopped, still patient might have some elements of

dementia, or toxic encephalopathy secondary to benzodiazepines which is stopped 

now.


Discussed the case with psychiatric team and their input is appreciated, aubrey

jovi mitchell new onset adjust her medication and to monitor her for another 24 

hours if she remains clinically stable   and she cleared for discharge by 

neurology and psychiatry service then she can go home and follow-up as an 

outpatient.  I discussed the case with the daughter Stephanie  








Objective





- Vital Signs


Vital signs: 


                                   Vital Signs











Temp  97.6 F   05/19/21 07:00


 


Pulse  60   05/19/21 07:00


 


Resp  18   05/19/21 07:00


 


BP  122/72   05/19/21 07:00


 


Pulse Ox  93 L  05/19/21 07:00








                                 Intake & Output











 05/18/21 05/19/21 05/19/21





 18:59 06:59 18:59


 


Intake Total   180


 


Balance   180


 


Intake:   


 


  Oral   180


 


Other:   


 


  Voiding Method Toilet Toilet Toilet


 


  # Voids 3 3 














- Exam





GENERAL: The patient is alert and oriented x3, not in any acute distress. Well 

developed, well nourished. 


HEENT: Pupils are round and equally reacting to light. EOMI. No scleral icterus.

No conjunctival pallor. Normocephalic, atraumatic. No pharyngeal erythema. No 

thyromegaly. 


CARDIOVASCULAR: S1 and S2 present. No murmurs, rubs, or gallops. 


PULMONARY: Chest is clear to auscultation, no wheezing or crackles. 


ABDOMEN: Soft, nontender, nondistended, normoactive bowel sounds. No palpable 

organomegaly. 


MUSCULOSKELETAL: No joint swelling or deformity. 


EXTREMITIES: No cyanosis, clubbing, or pedal edema. 


NEUROLOGICAL: Gross neurological examination did not reveal any focal deficits. 


SKIN: No rashes. no petechiae.





- Labs


CBC & Chem 7: 


                                 05/18/21 05:24





                                 05/19/21 05:38


Labs: 


                  Abnormal Lab Results - Last 24 Hours (Table)











  05/19/21 Range/Units





  05:38 


 


Anion Gap  12.20 H  (4.00-12.00)  mmol/L


 


Est GFR (CKD-EPI)NonAf  54.7 L  (60.0-200.0)   








                      Microbiology - Last 24 Hours (Table)











 05/16/21 10:52 Blood Culture - Preliminary





 Blood    No Growth after 72 hours














Assessment and Plan


Assessment: 








Periods of altered mental status secondary to metabolic and toxic 

encephalopathy.  Improvement.  However could be due to that she needs to be 

checked as an outpatient


Acute urinary tract infection, finished therapy and antibiotics were 

discontinued, keep monitoring while off antibiotics


Possible right internal carotid artery stenosis less than 50% however velocity 

showing normal and 50-70%














Plan: 





This is a pleasant 75 years old female who presents with encephalopathy and 

patient finished treatment for UTI.  Discontinue antibiotic.  Urine culture is 

negative.


We'll keep monitoring.  Neurology service on the case. psychiatry service on the

case 


Discontinue benzodiazepine including temazepam.  Give 1 dose of Seroquel.  Keep 

monitoring mental status closely


Labs and medication were reviewed..  Continue same treatment.  Continue with 

symptomatic treatment.  Resume home medication.  Monitor lytes and vitals.  DVT 

and GI prophylaxis.  Further recommendationsas per clinical course of the 

patient


DVT prophylaxis: Subcutaneous heparin


GI Prophylaxis: Ppi


PT/OT: Home


Prognosis is guarded


Possible discharge in 24 hours if she remains stable

## 2021-05-20 VITALS
HEART RATE: 54 BPM | DIASTOLIC BLOOD PRESSURE: 80 MMHG | SYSTOLIC BLOOD PRESSURE: 168 MMHG | RESPIRATION RATE: 16 BRPM | TEMPERATURE: 97.9 F

## 2021-05-20 RX ADMIN — FOLIC ACID-PYRIDOXINE-CYANOCOBALAMIN TAB 2.5-25-2 MG SCH EACH: 2.5-25-2 TAB at 08:53

## 2021-05-20 RX ADMIN — CEFAZOLIN SCH MLS/HR: 330 INJECTION, POWDER, FOR SOLUTION INTRAMUSCULAR; INTRAVENOUS at 06:09

## 2021-05-20 RX ADMIN — CLOPIDOGREL BISULFATE SCH MG: 75 TABLET ORAL at 08:52

## 2021-05-20 RX ADMIN — CITALOPRAM HYDROBROMIDE SCH MG: 20 TABLET ORAL at 08:53

## 2021-05-20 RX ADMIN — PANTOPRAZOLE SODIUM SCH MG: 40 TABLET, DELAYED RELEASE ORAL at 07:40

## 2021-05-20 RX ADMIN — Medication SCH MG: at 11:35

## 2021-05-20 RX ADMIN — HEPARIN SODIUM SCH: 5000 INJECTION INTRAVENOUS; SUBCUTANEOUS at 08:55

## 2021-05-20 RX ADMIN — FOLIC ACID SCH MG: 1 TABLET ORAL at 11:35

## 2021-05-20 RX ADMIN — PYRIDOXINE HCL TAB 50 MG SCH MG: 50 TAB at 08:53

## 2021-05-20 RX ADMIN — THERA TABS SCH EACH: TAB at 11:35

## 2021-05-20 NOTE — P.PN
Subjective


Progress Note Date: 05/20/21 05/19/2021: Patient was seen for a follow-up.  Patient wants to go home.  Offers

no complaints.  No further hallucinations or delusions.





05/18/2021: Patient offers no new complaints.  Wants to go home.  No visual 

hallucinations reported.  Spoke to Dr. fermin.





05/17/2021 Patient was seen for a follow-up.  Patient initially seen by Dr. Elfego Koroma.  Please refer to his note for details.





Patient was admitted for altered mental status of 1 week duration with visual 

hallucination.  Some concern about underlying cognitive impairment and delirium.

 Patient states that she wants to take shower as she is noticing "smell".  She 

states "nothing going on, can't believe what is happening to her".  She states 

that her brother is talking across the titus.  She still slightly delusional.





Objective





- Vital Signs


Vital signs: 


                                   Vital Signs











Temp  97.9 F   05/20/21 15:00


 


Pulse  54 L  05/20/21 15:00


 


Resp  16   05/20/21 15:00


 


BP  168/80   05/20/21 15:00


 


Pulse Ox  95   05/20/21 15:00








                                 Intake & Output











 05/19/21 05/20/21 05/20/21





 18:59 06:59 18:59


 


Intake Total 498  424


 


Balance 498  424


 


Intake:   


 


  Oral 498  424


 


Other:   


 


  Voiding Method Toilet Toilet Toilet


 


  # Voids 2 2 














- Exam





Patient's mental status, speech and language functions are normal.  Detailed 

mental status testing was performed today.  Patient scored 30/30 on MMSE.  

Patient has negative visuospatial apraxia.  Patient has mildly positive 

palmomental reflex.  Clock drawing test was perfect.  Please see the picture in 

the scanned documents section.





- Labs


CBC & Chem 7: 


                                 05/18/21 05:24





                                 05/19/21 05:38


Labs: 


                      Microbiology - Last 24 Hours (Table)











 05/16/21 10:52 Blood Culture - Preliminary





 Blood    No Growth after 96 hours














Assessment and Plan


Assessment: 





Probable delirium, unclear etiology.  Probably related to UTI.  Patient 

performed fairly well on cognitive function testing at this time.  


Mild encephalopathy from underlying UTI


Calcified granuloma at the periphery of the right lung base Per CXR


Plan: 





* Performed cognitive function testing and she performed very well with scoring 

  30/30 on MMSE and normal clock drawing test.  I think patient has delirium 

  from either UTI or from vitamin deficiency.


* Vitamin B12 361, folate 7.8, TSH normal 2.87, methylmalonic acid still 

  pending.  We will start B12 and folate as they are on the lower limits of 

  normal range.


* MRI of the brain w/ and w/o revealed progressive white matter demyelination 

  possibly due to chronic small vessel ischemic disease.  There is age-related 

  atrophy.  No acute process.  


* EEG normal awake and drowsy.  No epileptiform activity seen.  


* 2-D echo showed normal left-ventricular size, mild concentric LVH, EF is 

  mildly impaired 45-50%.  Mild aortic valve sclerosis.


* Carotid Doppler revealed antegrade flow in the vertebral arteries.  Less than 

  50% stenosis in both ICAs.  Bilateral plaque formation.  Right ICA has 

  increased velocity.  The images showed less than 50% stenosis but the velocity

  measurements suggest 50-70% stenosis of the right ICA.  Continue Plavix 75 mg,

  Lipitor 40 mg. vascular surgeries in the patient, appreciate input.


* Appreciate psychiatric input.  Impression is cognitive disorder, not otherwise

  specified and rule out dementia versus delirium.  Collateral history was 

  recommended.  It was also felt the patient cannot live on her own by herself 

  because she is risk to herself.


* Recommend patient to follow-up with a neurologist as outpatient if she 

  continues to have issues with mental function/cognitive functions.  I would 

  not place her on Aricept at this time.  Patient currently on Celexa 40 mg, 

  Haldol when necessary.  Benzodiazepines has been discontinued.  Only if 

  symptoms persist, I would recommend brain SPECT scan as outpatient to evaluate

  for possible dementia.


* Neurologically clear for discharge.  Discussed with patient's daughter in 

  detail.

## 2021-05-20 NOTE — P.PN
Subjective


Progress Note Date: 21: Patient's family was also present today including her daughter and 

daughter-in-law.  Patient intermittently gets paranoid and delusional.  

Sometimes she thinks someone is trying to kill her.  Sometimes she thinks her 

brother is in the hallway.  However when psychiatrist arrives, patient is fully 

oriented and not delusional or paranoid.  Patient currently not on any scheduled

antipsychotic medication.  Patient has received only one dose of Seroquel.  Per 

family members, this issue has been going on for last 4-5 weeks.  They inform me

that patient already has been treated for UTI in the past.  They are not very 

clear about the diagnosis yet.





2021: Patient offers no new complaints.  Wants to go home.  No visual 

hallucinations reported.  Spoke to Dr. stewart.





2021 Patient was seen for a follow-up.  Patient initially seen by Dr. Elfego Koroma.  Please refer to his note for details.





Patient was admitted for altered mental status of 1 week duration with visual 

hallucination.  Some concern about underlying cognitive impairment and delirium.

 Patient states that she wants to take shower as she is noticing "smell".  She 

states "nothing going on, can't believe what is happening to her".  She states 

that her brother is talking across the titus.  She still slightly delusional.





Objective





- Vital Signs


Vital signs: 


                                   Vital Signs











Temp  97.5 F L  21 14:58


 


Pulse  65   21 14:58


 


Resp  16   21 14:58


 


BP  128/74   21 14:58


 


Pulse Ox  95   21 14:58








                                 Intake & Output











 21





 06:59 18:59 06:59


 


Intake Total  380 


 


Balance  380 


 


Intake:   


 


  Oral  380 


 


Other:   


 


  Voiding Method Toilet Toilet 


 


  # Voids 3 2 














- Exam





On examination patient is alert and awake in no distress.  She knows it is May 

2021 and that she is in [facility] in [place].  Patient knows

name of the current president.  Speech and language functions are normal.  

Attention, concentration, fund of knowledge is adequate.  Detail cognitive 

function testing deferred.  Cranial nerves are normal.  Muscle strength is 

normal.  No ataxia.  Tone and bulk of muscles normal.  Gait appears normal.





- Labs


CBC & Chem 7: 


                                 21 05:24





                                 21 05:38


Labs: 


                  Abnormal Lab Results - Last 24 Hours (Table)











  21 Range/Units





  05:38 


 


Anion Gap  12.20 H  (4.00-12.00)  mmol/L


 


Est GFR (CKD-EPI)NonAf  54.7 L  (60.0-200.0)   








                      Microbiology - Last 24 Hours (Table)











 21 10:52 Blood Culture - Preliminary





 Blood    No Growth after 72 hours














Assessment and Plan


Assessment: 





Altered mental status for the past one weeks (having visual hallucination of her

son who is  for the past one year and having thought that her friend is 

going to harm her in the past couple month): Possibly delirium (especially with 

underlying UTI).  Rule out underlying cognitive impairment/dementia


Mild encephalopathy from underlying UTI


Multiple vitamin deficiency B12, B6 and folate deficiency.


Acute urinary tract infection, with negative cultures.


Calcified granuloma at the periphery of the right lung base Per CXR


Plan: 





* Patient continues to use to be slightly delusional.  Psychiatry following the 

  patient.  Patient currently not on any scheduled antipsychotic medication.


* Vitamin B12 361, folate 7.8, TSH normal 2.87, B6 is borderline 5  (5-50), 

  vitamin B1 57 normal ().  methylmalonic acid still pending.  We will 

  start B12, folate and B6 replacement as they are on the lower limits of normal

  range.


* MRI of the brain w/ and w/o revealed progressive white matter demyelination 

  possibly due to chronic small vessel ischemic disease.  There is age-related 

  atrophy.  No acute process.  


* EEG normal awake and drowsy.  No epileptiform activity seen.  


* 2-D echo showed normal left-ventricular size, mild concentric LVH, EF is 

  mildly impaired 45-50%.  Mild aortic valve sclerosis.


* Carotid Doppler revealed antegrade flow in the vertebral arteries.  Less than 

  50% stenosis in both ICAs.  Bilateral plaque formation.  Right ICA has inc

  reased velocity.  The images showed less than 50% stenosis but the velocity 

  measurements suggest 50-70% stenosis of the right ICA.  Continue Plavix 75 mg,

  Lipitor 40 mg. vascular surgery input appreciated.  No indication for 

  revascularization surgery.


* Appreciate psychiatric input.  Impression is cognitive disorder, not otherwise

  specified and rule out dementia versus delirium.  Collateral history was 

  recommended.  It was also felt the patient cannot live on her own by herself 

  because she is risk to herself.


* Recommend patient to follow-up with a neurologist as outpatient for further 

  workup of cognitive impairment/dementia within 1-2 weeks.  Patient currently 

  on Celexa 40 mg, Haldol when necessary.  Benzodiazepines has been 

  discontinued.  I would recommend brain SPECT scan as outpatient to evaluate 

  for possible dementia.


* Defer the rest of medical management to the primary team.


* Neurologically clear for discharge.  Discussed with Dr. Stewart.

## 2021-05-20 NOTE — P.PN
Progress Note - Text


Progress Note Date: 05/20/21





Interval History:


Patient was seen today for psychiatric follow-up regarding patient's delirium.  

Patient was seen at the side of the bed with her daughter.  Patient's nurse 

states that she is doing much better today and less confused.  Patient's 

daughter states that she is back to her baseline and is not complaining of any 

confusion or hallucinations today.  Patient was appropriate with writer and 

joked around with him.  She denied any overnight events and states that she 

slept well.  She was focused on discharge today.  She was alert and oriented 3 

and knows the current Pres. is.  She was a lot more cooperative and appropriate 

with writer. She is denying any depression or anxiety today.  Patient and 

daughters asked several questions or patient's medication and her condition. At 

this time patient denies any suicidal or homical ideations, intent or plan. 

Patient denies any auditory, visual hallucinations and denies any paranoia or 

delusions. Patient denies any side effects from the medications and has been 

compliant with meds. 





Mental Status Exam:


General Appearance: Patient appears to be elderly, stated age is alert, 

directable, and cooperative. 


Behavior: Patient is calmly seated without any agitated behavior.  More 

appropriate today.


Speech: Patient's speech is fluent and nonpressured. 


Mood/Affect: Mood is improving mildly, affect is congruent and constricted. 


Suicidality/Homicidality:  Patient denies having any suicidal or homicidal 

ideation intent or plan.  


Perceptions: Patient denies any visual hallucinations and denies any auditory 

hallucinations  


Though content/process: There is no evidence of any delusional thought content 

and thought process is linear and goal-directed. 


Memory and concentration: AOX3, grossly intact for the purposes of this session.

 Fair attention span.  


Judgment and insight: Improved





Assessment


Delirium likely secondary to infection (uti)


History of depression





Plan:


-At this time patient DOES NOT meet criteria for inpatient psychiatric 

admission.


-Delirium precautions recommended with patient including - avoiding use of n

arcotics and CNS sedatives, limit anticholinergic medications when possible, 

frequent re-orientation, minimize use of restraints, open window shades during 

the day and close them at night


-Would recommend the following medication changes/additions: Continue with her 

home dose of Celexa.  Can continue with scheduled Haldol 1 mg twice a day for 

psychosis/delirium for 6 days and then to be discontinued.


- to provide patient with outpatient mental health/psychiatry 

resources for appropriate follow up upon discharge


-Communicated plan to patient's nurse


-Spoke with patient and daughter at the bedside about the plan and answered 

questions/concerns


-At this time psychiatry will sign off


-Please contact with any questions.

## 2021-05-21 NOTE — P.EN
Today I called and spoke to the daughter Janee Hsu at [phone] and 

discussed with her the recommendation of the psychiatrist to continue Haldol 

scheduled dose 1 mg twice daily for 6 days and then discontinue, I asked her to 

discard the rest of the prescription which I gave her for 30 days, Ms. Deborah 

verbalized understanding and said this information back to me correctly

## 2021-05-21 NOTE — P.DS
Providers


Date of admission: 


05/16/21 11:08





Attending physician: 


Ann Miranda





Consults: 





                                        





05/16/21 04:08


Consult Physician Routine 


   Consulting Provider: Elfego Koroma


   Consult Reason/Comments: altered mental status


   Do you want consulting provider notified?: Yes





05/16/21 04:09


Consult Physician Routine 


   Consulting Provider: Walt Crane


   Consult Reason/Comments: altered mental status


   Do you want consulting provider notified?: Yes





05/17/21 16:19


Consult Physician Routine 


   Consulting Provider: Emeka Echevarria


   Consult Reason/Comments: arrhythmia, carotid stenosis


   Do you want consulting provider notified?: Yes











Primary care physician: 


[facility] Course: 





Diagnoses:


Periods of altered mental status secondary to metabolic and toxic 

encephalopathy.  Improved.  Limits of dementia may be contributing and 

instructed patient and daughter to follow up as an outpatient


Acute urinary tract infection, finished therapy and antibiotics were 

discontinued, keep monitoring while off antibiotics


Possible right internal carotid artery stenosis less than 50% however velocity 

showing normal and 50-70%.  No surgical intervention peripheral vascular surgery


Low normal vitamin B12, replaced





Hospital course:


This is a pleasant 75 years old female with multiple medical problems presents 

with.  So confusion and suspected to have acute urinary tract infection, she was

admitted and treated with ceftriaxone, with neurology and vascular surgery team 

were consulted.  When I saw the patient she was alert and awake and call him, 

however later on during the day she become agitated so patient came to the hosp

ital for close monitoring for now.  Patient got 1 dose of 15 mg temazepam 2 

night, which may contribute to her toxic encephalopathy . Will benzodiazepines 

stopped.  Because it might contribute to her delirium.


Brain MRI showing cerebral atrophy.  EEG is normal.  Ejection fraction 40-45%.  

Pronecalcitonin only slightly up at 0.10.


Patient has been evaluated by neurology and psychiatry service


 and her workup was unremarkable including brain MRI, EEG, carotid duplex, echo.

 Urine culture is negative and she finished her treatment with UTI.  


Psychiatrist at the distal dorsal 1 mg twice daily, patient showed improvement 

with no more periods of confusion for more than 24 hours


Patient denies any other symptoms, no chest pain or dyspnea.  No abdominal pain.

 No nausea vomiting.  No change in urine or bowel habits.  No fever.


Intake is very eager to go home today


I discussed the case with her daughter Stephanie management plan


Patient was cleared for discharge by all consultants including psychiatry and 

neurology services


Problems and management plan were discussed with the patient and he verbalized 

understanding and acceptance


Patient was found stable and can be discharged home however he needs follow-up 

as an outpatient. Patient was instructed to follow up with PCP Dr. Slaughter 

within one week and patient agrees


Also patient was instructed to follow up with Dr. Echevarria for 1 week to follow-up 

with her Holter of her heart and she agrees


Also patient was instructed to follow up with neurologist Dr. Hernández/Dr. Vang/Dr. Parnell well as an outpatient in 2 weeks, also with Dr. Adams as an 

outpatient and patient agrees








Physical exam


Gen: patient is a AAOx3, no distress


CVS: S1-S2, RRR, no murmur


Lungs: B/L CTA, no wheezing


Abdomen: soft, no distention, no tenderness, positive bowel sounds


Extremity: no leg edema or induration





Time spent more than 35 minutes





Patient Condition at Discharge: Fair





Plan - Discharge Summary


Discharge Rx Participant: No


New Discharge Prescriptions: 


New


   Thiamine [Vitamin B-1] 100 mg PO DAILY@1200 #30 tab


   Pyridoxine [Vitamin B-6] 50 mg PO DAILY 7 Days #7 tab


   Cefuroxime [Ceftin] 250 mg PO BID 3 Days #6 tab


   Acetaminophen Tab [Tylenol] 650 mg PO Q6HR PRN  tab


     PRN Reason: Mild Pain Or Fever > 100.5


   Cyanocobalamin [Vitamin B-12] 1,000 mcg PO DAILY #60 tablet


   Folic Acid 1 mg PO DAILY@1200 7 Days #7 tab


   haloperidoL [Haldol] 1 mg PO BID #60 tab


   Melatonin 3 mg PO HS PRN #30 tablet


     PRN Reason: Insomnia





Continue


   amLODIPine BESYLATE [Norvasc] 5 mg PO BID


   Citalopram Hydrobromide [CeleXA] 40 mg PO DAILY


   Omeprazole 20 mg PO DAILY


   Clopidogrel [Plavix] 75 mg PO DAILY #30 tab


   Atorvastatin [Lipitor] 40 mg PO HS


   Ergocalciferol [Vitamin D2 (1250 Mcg = 63557 Iu)] 1,250 mcg PO FR





Discontinued


   Melatonin 20 mg PO HS PRN


     PRN Reason: Insomnia


Discharge Medication List





amLODIPine BESYLATE [Norvasc] 5 mg PO BID 07/12/16 [History]


Citalopram Hydrobromide [CeleXA] 40 mg PO DAILY 11/22/17 [History]


Omeprazole 20 mg PO DAILY 11/22/17 [History]


Clopidogrel [Plavix] 75 mg PO DAILY #30 tab 11/23/17 [Rx]


Atorvastatin [Lipitor] 40 mg PO HS 04/29/21 [History]


Ergocalciferol [Vitamin D2 (1250 Mcg = 31680 Iu)] 1,250 mcg PO FR 05/16/21 [H

istory]


Acetaminophen Tab [Tylenol] 650 mg PO Q6HR PRN  tab 05/18/21 [Rx]


Cefuroxime [Ceftin] 250 mg PO BID 3 Days #6 tab 05/18/21 [Rx]


Cyanocobalamin [Vitamin B-12] 1,000 mcg PO DAILY #60 tablet 05/18/21 [Rx]


Thiamine [Vitamin B-1] 100 mg PO DAILY@1200 #30 tab 05/18/21 [Rx]


Folic Acid 1 mg PO DAILY@1200 7 Days #7 tab 05/20/21 [Rx]


Melatonin 3 mg PO HS PRN #30 tablet 05/20/21 [Rx]


Pyridoxine [Vitamin B-6] 50 mg PO DAILY 7 Days #7 tab 05/20/21 [Rx]


haloperidoL [Haldol] 1 mg PO BID #60 tab 05/20/21 [Rx]








Follow up Appointment(s)/Referral(s): 


[facility], [NON-STAFF] - 05/22/21 (First available appointment, you will 

recieved a phone call regarding the time of the appointment.)


Sylwia Slaughter MD [Primary Care Provider] - 05/25/21 1:30 pm


Mary Tinoco MD [REFERRING] - 2 Weeks (neurologist )


Emeka Echevarria MD [STAFF PHYSICIAN] - 06/07/21 11:45 am


Yevgeniy Joseph DO [STAFF PHYSICIAN] - 06/15/21 3:45 pm


Blanca Hernández MD [Medical Doctor] - 2 Weeks (neurologist for your delirium 

when you were in the hospital)


Den Vang DO [STAFF PHYSICIAN] - 2 Weeks (neurologist )


Patient Instructions/Handouts:  Altered Mental Status (ED)


Activity/Diet/Wound Care/Special Instructions: 


Appointments to be made prior to discharge








Heart healthy diet








Activity is restricted till you see your doctor


Discharge/Stand Alone Forms:  Who Do I Call?


Discharge Disposition: HOME WITH HOME HEALTH SERVICES

## 2021-06-14 NOTE — CDI
Documentation Clarification Form



Date: 6/14/21

From: Josseline Mendoza

MRN: [id number]

Admit Date: 05/16/2021 11:08:00 AM

Patient Name: Pat Donald

Visit Number: [id number]

Discharge Date:  05/20/2021 04:13:00 PM





ATTENTION: The Clinical Documentation Specialists (CDI) and [facility] Coding Staff 
appreciate your assistance in clarifying documentation. Please respond to the 
clarification below the line at the bottom and electronically sign. The CDI & 
[facility] Coding staff will review the response and follow-up if needed. Please note: 
Queries are made part of the Legal Health Record. If you have any questions, 
please contact the author of this message via ITS.



Dr. Goff E Sheet, 



Probable UTI with sepsis is documented in H&P and PN 5/17 by Dr Miranda, but is 
not noted in subsequent documentation.  Clarification is requested.



History/Risk Factors: toxic encephalopathy, dementia, CAD, COPD, HTN



Clinical Indicators: WBC-9.1, Neutrophils-70, no lactic acid, T-98.2, P-72, R-
19, BP-156/84, O2-97, blood culture-negative, Urine culture-negative



Treatment:  IV fluids, IV Rocephin



Please clarify if the sepsis is:



[  ]  Sepsis confirmed, remains under treatment

[  ]  Sepsis ruled out

[  ]  Other condition, please specify ______

[  ]  Unable to determine

___________________________________________________________________________

no sepsis 

MTDD

## 2021-10-24 ENCOUNTER — HOSPITAL ENCOUNTER (EMERGENCY)
Dept: HOSPITAL 47 - EC | Age: 76
Discharge: HOME | End: 2021-10-24
Payer: MEDICARE

## 2021-10-24 VITALS — SYSTOLIC BLOOD PRESSURE: 144 MMHG | RESPIRATION RATE: 18 BRPM | TEMPERATURE: 97.9 F | DIASTOLIC BLOOD PRESSURE: 80 MMHG

## 2021-10-24 VITALS — HEART RATE: 86 BPM

## 2021-10-24 DIAGNOSIS — Z82.49: ICD-10-CM

## 2021-10-24 DIAGNOSIS — Z79.899: ICD-10-CM

## 2021-10-24 DIAGNOSIS — Z79.02: ICD-10-CM

## 2021-10-24 DIAGNOSIS — F41.9: ICD-10-CM

## 2021-10-24 DIAGNOSIS — Z79.82: ICD-10-CM

## 2021-10-24 DIAGNOSIS — I25.2: ICD-10-CM

## 2021-10-24 DIAGNOSIS — J06.9: Primary | ICD-10-CM

## 2021-10-24 DIAGNOSIS — I25.10: ICD-10-CM

## 2021-10-24 DIAGNOSIS — Z20.822: ICD-10-CM

## 2021-10-24 DIAGNOSIS — Z79.52: ICD-10-CM

## 2021-10-24 DIAGNOSIS — F32.9: ICD-10-CM

## 2021-10-24 DIAGNOSIS — Z88.8: ICD-10-CM

## 2021-10-24 DIAGNOSIS — J44.9: ICD-10-CM

## 2021-10-24 DIAGNOSIS — I10: ICD-10-CM

## 2021-10-24 DIAGNOSIS — Z87.891: ICD-10-CM

## 2021-10-24 LAB
ALBUMIN SERPL-MCNC: 4.3 G/DL (ref 3.5–5)
ALP SERPL-CCNC: 107 U/L (ref 38–126)
ALT SERPL-CCNC: 19 U/L (ref 4–34)
ANION GAP SERPL CALC-SCNC: 8 MMOL/L
APTT BLD: 20.1 SEC (ref 22–30)
AST SERPL-CCNC: 26 U/L (ref 14–36)
BASOPHILS # BLD AUTO: 0 K/UL (ref 0–0.2)
BASOPHILS NFR BLD AUTO: 0 %
BUN SERPL-SCNC: 19 MG/DL (ref 7–17)
CALCIUM SPEC-MCNC: 10.6 MG/DL (ref 8.4–10.2)
CHLORIDE SERPL-SCNC: 103 MMOL/L (ref 98–107)
CO2 SERPL-SCNC: 27 MMOL/L (ref 22–30)
EOSINOPHIL # BLD AUTO: 0 K/UL (ref 0–0.7)
EOSINOPHIL NFR BLD AUTO: 0 %
ERYTHROCYTE [DISTWIDTH] IN BLOOD BY AUTOMATED COUNT: 4.98 M/UL (ref 3.8–5.4)
ERYTHROCYTE [DISTWIDTH] IN BLOOD: 12.5 % (ref 11.5–15.5)
GLUCOSE SERPL-MCNC: 118 MG/DL (ref 74–99)
HCT VFR BLD AUTO: 44.4 % (ref 34–46)
HGB BLD-MCNC: 14.4 GM/DL (ref 11.4–16)
INR PPP: 0.9 (ref ?–1.2)
LYMPHOCYTES # SPEC AUTO: 1 K/UL (ref 1–4.8)
LYMPHOCYTES NFR SPEC AUTO: 12 %
MCH RBC QN AUTO: 29 PG (ref 25–35)
MCHC RBC AUTO-ENTMCNC: 32.5 G/DL (ref 31–37)
MCV RBC AUTO: 89.3 FL (ref 80–100)
MONOCYTES # BLD AUTO: 0.2 K/UL (ref 0–1)
MONOCYTES NFR BLD AUTO: 2 %
NEUTROPHILS # BLD AUTO: 7.4 K/UL (ref 1.3–7.7)
NEUTROPHILS NFR BLD AUTO: 85 %
PH UR: 5.5 [PH] (ref 5–8)
PLATELET # BLD AUTO: 340 K/UL (ref 150–450)
POTASSIUM SERPL-SCNC: 4.1 MMOL/L (ref 3.5–5.1)
PROT SERPL-MCNC: 7 G/DL (ref 6.3–8.2)
PT BLD: 10.1 SEC (ref 9–12)
RBC UR QL: <1 /HPF (ref 0–5)
SODIUM SERPL-SCNC: 138 MMOL/L (ref 137–145)
SP GR UR: 1.02 (ref 1–1.03)
SQUAMOUS UR QL AUTO: <1 /HPF (ref 0–4)
UROBILINOGEN UR QL STRIP: <2 MG/DL (ref ?–2)
WBC # BLD AUTO: 8.7 K/UL (ref 3.8–10.6)
WBC # UR AUTO: 2 /HPF (ref 0–5)

## 2021-10-24 PROCEDURE — 85730 THROMBOPLASTIN TIME PARTIAL: CPT

## 2021-10-24 PROCEDURE — 99284 EMERGENCY DEPT VISIT MOD MDM: CPT

## 2021-10-24 PROCEDURE — 36415 COLL VENOUS BLD VENIPUNCTURE: CPT

## 2021-10-24 PROCEDURE — 85610 PROTHROMBIN TIME: CPT

## 2021-10-24 PROCEDURE — 71046 X-RAY EXAM CHEST 2 VIEWS: CPT

## 2021-10-24 PROCEDURE — 81001 URINALYSIS AUTO W/SCOPE: CPT

## 2021-10-24 PROCEDURE — 85025 COMPLETE CBC W/AUTO DIFF WBC: CPT

## 2021-10-24 PROCEDURE — 96360 HYDRATION IV INFUSION INIT: CPT

## 2021-10-24 PROCEDURE — 93005 ELECTROCARDIOGRAM TRACING: CPT

## 2021-10-24 PROCEDURE — 80053 COMPREHEN METABOLIC PANEL: CPT

## 2021-10-24 PROCEDURE — 87636 SARSCOV2 & INF A&B AMP PRB: CPT

## 2021-10-24 PROCEDURE — 84484 ASSAY OF TROPONIN QUANT: CPT

## 2021-10-24 PROCEDURE — 83605 ASSAY OF LACTIC ACID: CPT

## 2021-10-24 NOTE — XR
EXAMINATION TYPE: XR chest 2V

 

DATE OF EXAM: 10/24/2021

 

COMPARISON: 5/16/2021

 

HISTORY: Weakness

 

TECHNIQUE: 2 views

 

FINDINGS: There is no heart failure nor confluent pneumonic infiltrate. Costophrenic angles are clear
. There is 1.3 cm calcified granuloma right lower lobe. Bony thorax appears intact.

 

IMPRESSION: No active cardiopulmonary disease. Normal heart. No change.

## 2021-10-24 NOTE — ED
Weakness HPI





- General


Chief complaint: Weakness


Stated complaint: Cough,Weakness


Time Seen by Provider: 10/24/21 15:10


Source: patient, RN notes reviewed


Mode of arrival: ambulatory


Limitations: no limitations





- History of Present Illness


Initial comments: 





Patient is a 76-year-old female that presents to the emergency room complaining 

of not feeling well for the past several days.  She notes that she's had a cough

and some mild upper respiratory tract symptoms.  She notes she does have a 

history of COPD but does not use oxygen at home.  Patient states that she went 

to an urgent care got steroids and antibiotic prophylactically for upper 

respiratory tract infection.  She was otherwise well-appearing in no apparent 

distress.  She notes that she is just feeling under the weather.  She denied 

chest pain headache nausea vomiting diarrhea constipation fever fatigue chills.





- Related Data


                                Home Medications











 Medication  Instructions  Recorded  Confirmed


 


amLODIPine BESYLATE [Norvasc] 5 mg PO BID 07/12/16 10/24/21


 


Omeprazole 20 mg PO DAILY 11/22/17 10/24/21


 


Atorvastatin [Lipitor] 40 mg PO HS 04/29/21 10/24/21


 


Ergocalciferol [Vitamin D2 (1250 1,250 mcg PO FR 05/16/21 10/24/21





Mcg = 69113 Iu)]   


 


Aspirin EC [Ecotrin Low Dose] 81 mg PO DAILY 10/24/21 10/24/21


 


Doxycycline Hyclate 100 mg PO Q12H 10/24/21 10/24/21


 


Lithium Carbonate [Lithium 300 mg PO HS 10/24/21 10/24/21





Carbonate ER]   


 


Melatonin 5 mg PO HS 10/24/21 10/24/21


 


Thiamine [Vitamin B-1] 100 mg PO DAILY 10/24/21 10/24/21


 


predniSONE 50 mg PO DAILY 10/24/21 10/24/21








                                  Previous Rx's











 Medication  Instructions  Recorded


 


Clopidogrel [Plavix] 75 mg PO DAILY #30 tab 17











                                    Allergies











Allergy/AdvReac Type Severity Reaction Status Date / Time


 


zolpidem tartrate AdvReac Severe Hallucinati Verified 10/24/21 16:42





[From Ambien]   ons  














Review of Systems


ROS Statement: 


Those systems with pertinent positive or pertinent negative responses have been 

documented in the HPI.





ROS Other: All systems not noted in ROS Statement are negative.





Past Medical History


Past Medical History: Coronary Artery Disease (CAD), Chest Pain / Angina, COPD, 

Hypertension, Syncope, Thyroid Disorder


Additional Past Medical History / Comment(s): parathyroid nodules, high calcium


Last Myocardial Infarction Date:: 2015


History of Any Multi-Drug Resistant Organisms: None Reported


Past Surgical History: Heart Catheterization With Stent


Additional Past Surgical History / Comment(s): cyst removed from back, heart 

cath with a stent placed 2015. parathyroid removal


Past Anesthesia/Blood Transfusion Reactions: No Reported Reaction


Date of Last Stent Placement:: 2015


Past Psychological History: Anxiety, Depression


Smoking Status: Former smoker


Past Alcohol Use History: Occasional, Rare


Past Drug Use History: None Reported





- Past Family History


  ** Father


Family Medical History: No Reported History


Additional Family Medical History / Comment(s): FATHER  AT AGE 70. PT UNSURE

WHAT EXACTLY HE PASSED AWAY FROM.





  ** Mother


Family Medical History: Congestive Heart Failure (CHF)


Additional Family Medical History / Comment(s): MOTHER  AT AGE 91 YRS.





General Exam


Limitations: no limitations


General appearance: alert, in no apparent distress


Head exam: Present: atraumatic, normocephalic, normal inspection


Eye exam: Present: normal appearance, PERRL, EOMI.  Absent: scleral icterus, 

conjunctival injection, periorbital swelling


ENT exam: Present: normal exam, mucous membranes moist


Neck exam: Present: normal inspection


Respiratory exam: Present: normal lung sounds bilaterally.  Absent: respiratory 

distress, wheezes, rales, rhonchi, stridor


Cardiovascular Exam: Present: regular rate, normal rhythm, normal heart sounds. 

Absent: systolic murmur, diastolic murmur, rubs, gallop, clicks


GI/Abdominal exam: Present: soft, normal bowel sounds.  Absent: distended, 

tenderness, guarding, rebound, rigid


Extremities exam: Present: normal inspection, full ROM, normal capillary refill.

 Absent: tenderness, pedal edema, joint swelling, calf tenderness


Neurological exam: Present: alert, oriented X3


Psychiatric exam: Present: normal affect, normal mood


Skin exam: Present: warm, dry, intact, normal color.  Absent: rash





Course


                                   Vital Signs











  10/24/21 10/24/21





  14:11 15:02


 


Temperature 97.9 F 


 


Pulse Rate 64 


 


Pulse Rate [  86





Left Radial]  


 


Respiratory 18 18





Rate  


 


Blood Pressure 144/80 


 


O2 Sat by Pulse 96 





Oximetry  














EKG Findings





- EKG Comments:


EKG Findings:: Ventricular rate 49 bpm, GA interval 182 ms, QRS duration 104 ms,

 ms, PRT axes 42/4/-21.  Sinus bradycardia premature atrial complex, ST

and T wave abnormality consider inferior ischemia, ST and T wave abnormality 

consider anterior lateral ischemia, abnormal ECG.





Medical Decision Making





- Medical Decision Making





76-year-old female complaining of being under the weather and not feeling well.


Labs, EKG, cardiac monitor, chest x-ray, 1 L normal saline ordered.


Labs unremarkable, mildly dehydrated.


EKG similar to previous studies.


Chest x-ray no acute card up on her process.


Patient most likely strained sitting a common cold/upper respiratory tract 

infection.


Case discussed with Dr. Edwards, patient can discharge home.





- Lab Data


Result diagrams: 


                                 10/24/21 15:47





                                 10/24/21 15:47


                                   Lab Results











  10/24/21 10/24/21 10/24/21 Range/Units





  14:17 15:47 15:47 


 


WBC   8.7   (3.8-10.6)  k/uL


 


RBC   4.98   (3.80-5.40)  m/uL


 


Hgb   14.4   (11.4-16.0)  gm/dL


 


Hct   44.4   (34.0-46.0)  %


 


MCV   89.3   (80.0-100.0)  fL


 


MCH   29.0   (25.0-35.0)  pg


 


MCHC   32.5   (31.0-37.0)  g/dL


 


RDW   12.5   (11.5-15.5)  %


 


Plt Count   340   (150-450)  k/uL


 


MPV   8.0   


 


Neutrophils %   85   %


 


Lymphocytes %   12   %


 


Monocytes %   2   %


 


Eosinophils %   0   %


 


Basophils %   0   %


 


Neutrophils #   7.4   (1.3-7.7)  k/uL


 


Lymphocytes #   1.0   (1.0-4.8)  k/uL


 


Monocytes #   0.2   (0-1.0)  k/uL


 


Eosinophils #   0.0   (0-0.7)  k/uL


 


Basophils #   0.0   (0-0.2)  k/uL


 


PT    10.1  (9.0-12.0)  sec


 


INR    0.9  (<1.2)  


 


APTT    20.1 L  (22.0-30.0)  sec


 


Sodium     (137-145)  mmol/L


 


Potassium     (3.5-5.1)  mmol/L


 


Chloride     ()  mmol/L


 


Carbon Dioxide     (22-30)  mmol/L


 


Anion Gap     mmol/L


 


BUN     (7-17)  mg/dL


 


Creatinine     (0.52-1.04)  mg/dL


 


Est GFR (CKD-EPI)AfAm     (>60 ml/min/1.73 sqM)  


 


Est GFR (CKD-EPI)NonAf     (>60 ml/min/1.73 sqM)  


 


Glucose     (74-99)  mg/dL


 


Plasma Lactic Acid Joe     (0.7-2.0)  mmol/L


 


Calcium     (8.4-10.2)  mg/dL


 


Total Bilirubin     (0.2-1.3)  mg/dL


 


AST     (14-36)  U/L


 


ALT     (4-34)  U/L


 


Alkaline Phosphatase     ()  U/L


 


Troponin I     (0.000-0.034)  ng/mL


 


Total Protein     (6.3-8.2)  g/dL


 


Albumin     (3.5-5.0)  g/dL


 


Urine Color     


 


Urine Appearance     (Clear)  


 


Urine pH     (5.0-8.0)  


 


Ur Specific Gravity     (1.001-1.035)  


 


Urine Protein     (Negative)  


 


Urine Glucose (UA)     (Negative)  


 


Urine Ketones     (Negative)  


 


Urine Blood     (Negative)  


 


Urine Nitrite     (Negative)  


 


Urine Bilirubin     (Negative)  


 


Urine Urobilinogen     (<2.0)  mg/dL


 


Ur Leukocyte Esterase     (Negative)  


 


Urine RBC     (0-5)  /hpf


 


Urine WBC     (0-5)  /hpf


 


Ur Squamous Epith Cells     (0-4)  /hpf


 


Urine Mucus     (None)  /hpf


 


Influenza Type A (PCR)  Not Detected    (Not Detectd)  


 


Influenza Type B (PCR)  Not Detected    (Not Detectd)  


 


RSV (PCR)  Not Detected    (Not Detectd)  


 


SARS-CoV-2 (PCR)  Not Detected    (Not Detectd)  














  10/24/21 10/24/21 10/24/21 Range/Units





  15:47 15:47 15:47 


 


WBC     (3.8-10.6)  k/uL


 


RBC     (3.80-5.40)  m/uL


 


Hgb     (11.4-16.0)  gm/dL


 


Hct     (34.0-46.0)  %


 


MCV     (80.0-100.0)  fL


 


MCH     (25.0-35.0)  pg


 


MCHC     (31.0-37.0)  g/dL


 


RDW     (11.5-15.5)  %


 


Plt Count     (150-450)  k/uL


 


MPV     


 


Neutrophils %     %


 


Lymphocytes %     %


 


Monocytes %     %


 


Eosinophils %     %


 


Basophils %     %


 


Neutrophils #     (1.3-7.7)  k/uL


 


Lymphocytes #     (1.0-4.8)  k/uL


 


Monocytes #     (0-1.0)  k/uL


 


Eosinophils #     (0-0.7)  k/uL


 


Basophils #     (0-0.2)  k/uL


 


PT     (9.0-12.0)  sec


 


INR     (<1.2)  


 


APTT     (22.0-30.0)  sec


 


Sodium  138    (137-145)  mmol/L


 


Potassium  4.1    (3.5-5.1)  mmol/L


 


Chloride  103    ()  mmol/L


 


Carbon Dioxide  27    (22-30)  mmol/L


 


Anion Gap  8    mmol/L


 


BUN  19 H    (7-17)  mg/dL


 


Creatinine  0.86    (0.52-1.04)  mg/dL


 


Est GFR (CKD-EPI)AfAm  76    (>60 ml/min/1.73 sqM)  


 


Est GFR (CKD-EPI)NonAf  66    (>60 ml/min/1.73 sqM)  


 


Glucose  118 H    (74-99)  mg/dL


 


Plasma Lactic Acid Joe   1.1   (0.7-2.0)  mmol/L


 


Calcium  10.6 H    (8.4-10.2)  mg/dL


 


Total Bilirubin  0.3    (0.2-1.3)  mg/dL


 


AST  26    (14-36)  U/L


 


ALT  19    (4-34)  U/L


 


Alkaline Phosphatase  107    ()  U/L


 


Troponin I    <0.012  (0.000-0.034)  ng/mL


 


Total Protein  7.0    (6.3-8.2)  g/dL


 


Albumin  4.3    (3.5-5.0)  g/dL


 


Urine Color     


 


Urine Appearance     (Clear)  


 


Urine pH     (5.0-8.0)  


 


Ur Specific Gravity     (1.001-1.035)  


 


Urine Protein     (Negative)  


 


Urine Glucose (UA)     (Negative)  


 


Urine Ketones     (Negative)  


 


Urine Blood     (Negative)  


 


Urine Nitrite     (Negative)  


 


Urine Bilirubin     (Negative)  


 


Urine Urobilinogen     (<2.0)  mg/dL


 


Ur Leukocyte Esterase     (Negative)  


 


Urine RBC     (0-5)  /hpf


 


Urine WBC     (0-5)  /hpf


 


Ur Squamous Epith Cells     (0-4)  /hpf


 


Urine Mucus     (None)  /hpf


 


Influenza Type A (PCR)     (Not Detectd)  


 


Influenza Type B (PCR)     (Not Detectd)  


 


RSV (PCR)     (Not Detectd)  


 


SARS-CoV-2 (PCR)     (Not Detectd)  














  10/24/21 Range/Units





  16:20 


 


WBC   (3.8-10.6)  k/uL


 


RBC   (3.80-5.40)  m/uL


 


Hgb   (11.4-16.0)  gm/dL


 


Hct   (34.0-46.0)  %


 


MCV   (80.0-100.0)  fL


 


MCH   (25.0-35.0)  pg


 


MCHC   (31.0-37.0)  g/dL


 


RDW   (11.5-15.5)  %


 


Plt Count   (150-450)  k/uL


 


MPV   


 


Neutrophils %   %


 


Lymphocytes %   %


 


Monocytes %   %


 


Eosinophils %   %


 


Basophils %   %


 


Neutrophils #   (1.3-7.7)  k/uL


 


Lymphocytes #   (1.0-4.8)  k/uL


 


Monocytes #   (0-1.0)  k/uL


 


Eosinophils #   (0-0.7)  k/uL


 


Basophils #   (0-0.2)  k/uL


 


PT   (9.0-12.0)  sec


 


INR   (<1.2)  


 


APTT   (22.0-30.0)  sec


 


Sodium   (137-145)  mmol/L


 


Potassium   (3.5-5.1)  mmol/L


 


Chloride   ()  mmol/L


 


Carbon Dioxide   (22-30)  mmol/L


 


Anion Gap   mmol/L


 


BUN   (7-17)  mg/dL


 


Creatinine   (0.52-1.04)  mg/dL


 


Est GFR (CKD-EPI)AfAm   (>60 ml/min/1.73 sqM)  


 


Est GFR (CKD-EPI)NonAf   (>60 ml/min/1.73 sqM)  


 


Glucose   (74-99)  mg/dL


 


Plasma Lactic Acid Joe   (0.7-2.0)  mmol/L


 


Calcium   (8.4-10.2)  mg/dL


 


Total Bilirubin   (0.2-1.3)  mg/dL


 


AST   (14-36)  U/L


 


ALT   (4-34)  U/L


 


Alkaline Phosphatase   ()  U/L


 


Troponin I   (0.000-0.034)  ng/mL


 


Total Protein   (6.3-8.2)  g/dL


 


Albumin   (3.5-5.0)  g/dL


 


Urine Color  Yellow  


 


Urine Appearance  Clear  (Clear)  


 


Urine pH  5.5  (5.0-8.0)  


 


Ur Specific Gravity  1.016  (1.001-1.035)  


 


Urine Protein  Negative  (Negative)  


 


Urine Glucose (UA)  Negative  (Negative)  


 


Urine Ketones  Negative  (Negative)  


 


Urine Blood  Negative  (Negative)  


 


Urine Nitrite  Negative  (Negative)  


 


Urine Bilirubin  Negative  (Negative)  


 


Urine Urobilinogen  <2.0  (<2.0)  mg/dL


 


Ur Leukocyte Esterase  Trace H  (Negative)  


 


Urine RBC  <1  (0-5)  /hpf


 


Urine WBC  2  (0-5)  /hpf


 


Ur Squamous Epith Cells  <1  (0-4)  /hpf


 


Urine Mucus  Rare H  (None)  /hpf


 


Influenza Type A (PCR)   (Not Detectd)  


 


Influenza Type B (PCR)   (Not Detectd)  


 


RSV (PCR)   (Not Detectd)  


 


SARS-CoV-2 (PCR)   (Not Detectd)  














- EKG Data


-: EKG Interpreted by Me


EKG shows normal: sinus rhythm


Rate: bradycardia


EKG Comments: 





Ventricular rate 49 bpm, GA interval 182 ms, QRS duration 104 ms,  ms, 

PRT axes 42/4/-21.  Sinus bradycardia premature atrial complex, ST and T wave 

abnormality consider inferior ischemia, ST and T wave abnormality consider an

terior lateral ischemia, abnormal ECG.





- Radiology Data


Radiology results: report reviewed, image reviewed





Chest x-ray: No active cardiopulmonary disease.  Normal heart.  No change.





Disposition


Clinical Impression: 


 Upper respiratory tract infection





Disposition: HOME SELF-CARE


Condition: Stable


Instructions (If sedation given, give patient instructions):  Upper Respiratory 

Infection (ED)


Additional Instructions: 


Please return to the Emergency Department if symptoms worsen or any other 

concerns.


Follow-up with primary care 1-2 days.


Finishing antibiotics as prescribed.





Is patient prescribed a controlled substance at d/c from ED?: No


Referrals: 


Sylwia Slaughter MD [Primary Care Provider] - 1-2 days


Time of Disposition: 17:27

## 2023-03-14 ENCOUNTER — HOSPITAL ENCOUNTER (OUTPATIENT)
Dept: HOSPITAL 47 - CATHCVL | Age: 78
Discharge: HOME | End: 2023-03-14
Attending: INTERNAL MEDICINE
Payer: MEDICARE

## 2023-03-14 VITALS — TEMPERATURE: 97.1 F | RESPIRATION RATE: 16 BRPM

## 2023-03-14 VITALS — HEART RATE: 42 BPM | DIASTOLIC BLOOD PRESSURE: 60 MMHG | SYSTOLIC BLOOD PRESSURE: 141 MMHG

## 2023-03-14 DIAGNOSIS — G45.9: ICD-10-CM

## 2023-03-14 DIAGNOSIS — Z82.49: ICD-10-CM

## 2023-03-14 DIAGNOSIS — I35.1: ICD-10-CM

## 2023-03-14 DIAGNOSIS — Z79.899: ICD-10-CM

## 2023-03-14 DIAGNOSIS — Z79.82: ICD-10-CM

## 2023-03-14 DIAGNOSIS — I71.9: ICD-10-CM

## 2023-03-14 DIAGNOSIS — E78.5: ICD-10-CM

## 2023-03-14 DIAGNOSIS — I10: ICD-10-CM

## 2023-03-14 DIAGNOSIS — I25.10: Primary | ICD-10-CM

## 2023-03-14 DIAGNOSIS — Z95.5: ICD-10-CM

## 2023-03-14 DIAGNOSIS — F17.210: ICD-10-CM

## 2023-03-14 DIAGNOSIS — I25.82: ICD-10-CM

## 2023-03-14 PROCEDURE — 93458 L HRT ARTERY/VENTRICLE ANGIO: CPT

## 2023-03-14 NOTE — P.PCN
Date of Procedure: 03/14/23


Operative Findings: 








CARDIAC CATHETERIZATION





PERFORMING PHYSICIAN: 


Emeka Echevarria MD, RPVI





PROCEDURE PERFORMED:


1.  Selective right and left coronary angiogram


2.  Left heart catheterization





INDICATION:


this is a 77-year-old female patient with coronary artery disease and prior 

stenting of the left anterior descending artery was seen in the office recently 

for shortness of breath.  She underwent myocardial perfusion imaging stress test

and that showed an inferior ischemia.  For that reason she was brought today to 

undergo a heart catheterization





COMPLICATION:


None





APPROACH:


Right radial artery





LEVEL OF SEDATION:


Moderate with a sedation length of 15 minutes





PROCEDURE DESCRIPTION:


After obtaining an informed consent, the patient was brought to cardiac cath 

lab.  Local anesthesia was performed using lidocaine subcutaneously.  The right 

radial artery was cannulated using Seldinger technique, the guidewire passed 

easily, following that we advanced a 5-Burundian sheath dilator assembly, the wire 

and dilator were removed and sheath was flushed.  





Following that, 2 mg of verapamil along with 5000 unit heparin were given.





Selective right and left coronary angiogram using a 6-Burundian JR4 and JL 4 

catheters.  





Following that we did left heart catheterization using 6-Burundian pigtail 

catheter.  





The procedure was completed there was no complication.





SELECTIVE CORONARY ANGIOGRAM:


The right coronary artery:


large caliber vessel and a dominant vessel.  The RCA is chronically occluded 

distally and fills by collateral from the left coronary system





Left main:


is angiographically normal





The left circumflex:


large-caliber vessel and nondominant vessel.  The left circumflex has mild 

disease proximally.  Gives rises into large OM branch which appeared to be 

angiographically normal.





The left anterior descending artery:


the proximal LAD is a stented and the stent is patent.  The mid LAD has 

intermediate lesion focal appears to be in the range of 50% by the bifurcation 

of the diagonal branch.  The lesion appeared to be unchanged compared to before





HEMODYNAMICS:


The LVEDP was 12 mmHg with no gradient across aortic valve





CONCLUSION:


1. Chronic total occlusion of the RCA


2. Patent stent in the proximal LAD.  Intermediate lesion involving the mid LAD 

appears to be unchanged compared to before





POSTPROCEDURE MANAGEMENT:


Maximize medical treatment and follow-up with the patient

## 2023-04-20 ENCOUNTER — HOSPITAL ENCOUNTER (OUTPATIENT)
Dept: HOSPITAL 47 - LABWHC1 | Age: 78
Discharge: HOME | End: 2023-04-20
Attending: INTERNAL MEDICINE
Payer: MEDICARE

## 2023-04-20 DIAGNOSIS — E21.3: ICD-10-CM

## 2023-04-20 DIAGNOSIS — I10: Primary | ICD-10-CM

## 2023-04-20 DIAGNOSIS — E78.5: ICD-10-CM

## 2023-04-20 LAB
ALBUMIN SERPL-MCNC: 4.4 G/DL (ref 3.8–4.9)
ALBUMIN/GLOB SERPL: 2.23 G/DL (ref 1.6–3.17)
ALP SERPL-CCNC: 132 U/L (ref 41–126)
ALT SERPL-CCNC: 19 U/L (ref 8–44)
ANION GAP SERPL CALC-SCNC: 10.7 MMOL/L (ref 10–18)
AST SERPL-CCNC: 21 U/L (ref 13–35)
BUN SERPL-SCNC: 16.2 MG/DL (ref 9–27)
BUN/CREAT SERPL: 15.88 RATIO (ref 12–20)
CALCIUM SPEC-MCNC: 10.2 MG/DL (ref 8.7–10.3)
CHLORIDE SERPL-SCNC: 102 MMOL/L (ref 96–109)
CHOLEST SERPL-MCNC: 153 MG/DL (ref 0–200)
CO2 SERPL-SCNC: 29.5 MMOL/L (ref 20–27.5)
GLOBULIN SER CALC-MCNC: 2 G/DL (ref 1.6–3.3)
GLUCOSE SERPL-MCNC: 99 MG/DL (ref 70–110)
HDLC SERPL-MCNC: 69.6 MG/DL (ref 40–60)
LDLC SERPL CALC-MCNC: 65.8 MG/DL (ref 0–131)
POTASSIUM SERPL-SCNC: 4.5 MMOL/L (ref 3.5–5.5)
PROT SERPL-MCNC: 6.3 G/DL (ref 6.2–8.2)
SODIUM SERPL-SCNC: 143 MMOL/L (ref 135–145)
TRIGL SERPL-MCNC: 87.8 MG/DL (ref 0–149)
VLDLC SERPL CALC-MCNC: 17.56 MG/DL (ref 5–40)

## 2023-04-20 PROCEDURE — 83970 ASSAY OF PARATHORMONE: CPT

## 2023-04-20 PROCEDURE — 80053 COMPREHEN METABOLIC PANEL: CPT

## 2023-04-20 PROCEDURE — 82306 VITAMIN D 25 HYDROXY: CPT

## 2023-04-20 PROCEDURE — 80061 LIPID PANEL: CPT

## 2023-04-20 PROCEDURE — 36415 COLL VENOUS BLD VENIPUNCTURE: CPT

## 2023-04-20 PROCEDURE — 84443 ASSAY THYROID STIM HORMONE: CPT

## 2023-07-14 ENCOUNTER — HOSPITAL ENCOUNTER (OUTPATIENT)
Dept: HOSPITAL 47 - RADMAMWWP | Age: 78
Discharge: HOME | End: 2023-07-14
Attending: FAMILY MEDICINE
Payer: MEDICARE

## 2023-07-14 DIAGNOSIS — N63.20: Primary | ICD-10-CM

## 2023-07-14 DIAGNOSIS — Z78.0: ICD-10-CM

## 2023-07-14 PROCEDURE — 76642 ULTRASOUND BREAST LIMITED: CPT

## 2023-07-14 PROCEDURE — 77066 DX MAMMO INCL CAD BI: CPT

## 2023-07-14 PROCEDURE — 77062 BREAST TOMOSYNTHESIS BI: CPT

## 2023-07-14 NOTE — MM
Reason for Exam: Clinical finding. 

Last mammogram was performed 5 year(s) and 4 month(s) ago. 

Indicated Problems: 

Lump or thickening of the left side for 2 Day(s).





Patient History: 

Menarche at age 8. First Full-Term Pregnancy at age 21. Postmenopausal. 





Risk Values: 

Rubi 5 year model risk: 1.7%.

NCI Lifetime model risk: 3.0%.





Prior Study Comparison: 

7/15/2016 Bilateral Screening Mammogram, [facility]. 3/22/2018 Bilateral Screening Mammogram, [facility]. 





Tissue Density: 

The breast tissue is heterogeneously dense. This may lower the sensitivity of mammography.





Findings: 

Analyzed By CAD. 

Pattern appears stable. No discrete mammographic abnormality at the area marked on the left breast

the palpable abnormality.

No suspicious groups of microcalcifications, spiculated or lobular masses, architectural distortion

or other secondary signs of malignancy are mammographically apparent. 





Overall Assessment: Incomplete: need additional imaging evaluation, BI-RAD 0





Management: 

Diagnostic Breast Ultrasound of the left breast.

A negative mammogram report should not preclude additional follow up of suspicious palpable

abnormalities.

Patient should continue monthly self breast exam.

A clinical breast exam by your physician is recommended on an annual basis and results should be

correlated with mammographic findings.



Electronically signed and approved by: Gen Hinds D.O. Radiologis

## 2023-07-14 NOTE — USB
Reason for Exam: Clinical finding. 





Patient History: 

Menarche at age 8. First Full-Term Pregnancy at age 21. Postmenopausal. 





Risk Values: 

Rubi 5 year model risk: 1.7%.

NCI Lifetime model risk: 3.0%.

Technique: 

Method: Targeted.  





Prior Study Comparison: 

7/15/2016 Bilateral Screening Mammogram, [facility]. 3/22/2018 Bilateral Screening Mammogram, [facility]. 





Findings: 

The upper inner quadrant of the left breast, the axilla of the left breast and the retroareolar of

the left breast were scanned.

There is a hyperechoic somewhat ill-defined subcutaneous area corresponding to the palpable

abnormality measuring 1.3 x 0.9 cm may be a acute hematoma.



No suspicious solid areas are identified. No suspicious cysts are evident.. 





Overall Assessment: Probably benign, BI-RAD 3





Management: 

Diagnostic Breast Ultrasound of the left breast in 3 months.

A clinical breast exam by your physician is recommended on an annual basis and results should be

correlated with mammographic findings.  This exam should not preclude additional follow-up of

suspicious palpable abnormalities. Results were given to the patient verbally at the time of exam.



Electronically signed and approved by: Gen Hinds D.O. Radiologis

## 2023-07-26 ENCOUNTER — HOSPITAL ENCOUNTER (OUTPATIENT)
Dept: HOSPITAL 47 - 3 N SLEEP | Age: 78
End: 2023-07-26
Payer: MEDICARE

## 2023-07-26 DIAGNOSIS — F31.9: ICD-10-CM

## 2023-07-26 DIAGNOSIS — I10: Primary | ICD-10-CM

## 2023-07-26 DIAGNOSIS — Z86.73: ICD-10-CM

## 2023-07-26 DIAGNOSIS — E66.9: ICD-10-CM

## 2023-07-26 DIAGNOSIS — Z79.899: ICD-10-CM

## 2023-07-26 DIAGNOSIS — I25.10: ICD-10-CM

## 2023-07-26 DIAGNOSIS — Z79.01: ICD-10-CM

## 2023-07-26 DIAGNOSIS — Z88.8: ICD-10-CM

## 2023-07-26 DIAGNOSIS — F17.200: ICD-10-CM

## 2023-07-26 DIAGNOSIS — Z98.61: ICD-10-CM

## 2023-07-26 PROCEDURE — 99211 OFF/OP EST MAY X REQ PHY/QHP: CPT

## 2023-07-26 NOTE — P.SLEEP
History of Present Illness


DATE: 2023





CONSULTATION/NEW PATIENT EVALUATION





HISTORY OF PRESENT ILLNESS/SLEEP-WAKE EVALUATION:   77-year-old lady had been 

evaluated in the sleep center for possible obstructive sleep apnea hypopnea 

syndrome.





SLEEP SCHEDULE: Usually sleep schedule  from 10:11 PM until about 6 AM 7 days a 

week and that patient may stay in bed until around 8 AM.





FALLING ASLEEP:  Sometimes patient has problems with falling asleep, has TV set 

and bedroom and is difficult for her to fall asleep without TV on.





DURING SLEEP: Patient sleeps by herself subsequently no any information about 

snoring.  Sometimes patient wakes up from sleep and go to bathroom around 2 AMNo

history of hypnogogical hallucinations, sleep paralysis, or cataplexy.





DURING THE DAY/WAKE STATE:  In the morning patient wake up tired, has episodes 

of depression and anxiety.  Sometimes may take some naps.  [place] sleepiness 

scale is 5, which is in normal range.[].





PAST MEDICAL HISTORY:   Hypertension, depression, bipolar, coronary artery 

disease, TIA.





PAST SURGICAL HISTORY:  Stent insertion to coronary arteries disease, 3 

parathyroid glands removed.





MEDICATIONS:   Norvasc, omeprazole 20 mg once a day, Plavix 75 mg once a day, 

Lipitor 40 mg once a day, hydralazine 25 mg twice a day, hydrochlorothiazide 25 

mg once a day.





SOCIAL HISTORY:   Positive history of smoking for about 59 that years quit 2 

years ago, alcohol consumption occasional.





FAMILY HISTORY: Hypertension, kidney cancer by her aunt.





REVIEW OF SYSTEMS: Tiredness and sleepiness during the day. No fevers. No double

 vision. No recent chest pain. No shortness of breath. No abdominal pain. No 

bleeding episodes. No blood in urine. No seizure episodes. 





PHYSICAL EXAMINATION: 


GENERAL: A pleasant patient without any distress. 


VITAL SIGNS: BP   150/62 , HR  60 , RR  16 , weight  176.0 pounds, height  5 

foot  3-1/4  inches, body mass index  30.9, temperature 97.7, oxygen saturation 

at room air 96% .


HEENT: PERRLA, EOMI. Evaluation of oropharynx showed tongue protrudes midline, 

low position of soft palate Mallampati  4.


NECK: Supple. No JVD. Thyroid is not palpable.   14.5 inches in circumference.


LUNGS: Clear to percussion and to auscultation. Good air exchange. No wheezing 

or rhonchi. 


HEART: S1, S2 regular. No murmurs, gallops or rubs. 


ABDOMEN: Soft and nontender. Bowel sounds are present. No organomegaly 

appreciated. 


EXTREMITIES: No clubbing or cyanosis. 


CNS: Awake, alert, and oriented x3. Cranial nerves 2 to 7 intact. There is no 

fasciculation or atrophy noted. No focal deficits observed. 





ASSESSMENT:


1.  Low position of soft palate Mallampati 4, episodes of sleepiness during the 

day.  Possible obstructive sleep apnea hypopnea syndrome.


2.  History of depression and bipolar disorder.


3.  Coronary artery disease, status post stent insertion.


4.  History of TIA.


5   hypertension.


6 .  Status post 3 parathyroid gland removed.


7.  Very mild obesity by body mass index 30.9.





PLAN: 


1.    Polysomnography for evaluation of patient's breathing during sleep. 


2.   Following plan after reading sleep test


3.   Preferable position during sleep on the side. 


4.   No driving if patient feels any sleepiness. Patient is aware of civil and 

criminal liability for unsafe driving. 


5.             Sleep hygiene with regular sleep time for at least 7.5-8 hours.


6.   Watching weight. 





Thank you very much for referring this patient for consultation.





Sincerely,











Mohan Gupta MD, PhD, FAASM.


Diplomat of American Board of Sleep Medicine,


Sleep Medicine Board by American Board of Medical Specialities


American Board of Internal Medicine


Medical Director of [facility] [place]

















Past Medical History


Past Medical History: Coronary Artery Disease (CAD), Chest Pain / Angina, COPD, 

Hypertension, Syncope, Thyroid Disorder


Additional Past Medical History / Comment(s): parathyroid nodules, high calcium


Last Myocardial Infarction Date:: 2015


History of Any Multi-Drug Resistant Organisms: None Reported


Past Surgical History: Heart Catheterization With Stent


Additional Past Surgical History / Comment(s): cyst removed from back, heart 

cath with a stent placed 2015. parathyroid removal


Past Anesthesia/Blood Transfusion Reactions: No Reported Reaction


Date of Last Stent Placement:: 2015


Additional Psychological History / Comment(s): PT LIVES AT HOME AND HAS AN ADULT

 GRANDSON WHO LIVES WITH HER. SHE IS INDEPENDENT IN ALL ADL'S


Past Alcohol Use History: Occasional, Rare


Additional Past Alcohol Use History / Comment(s): SMOKED SINCE AGE 16, 1 PPD,





- Past Family History


  ** Father


Family Medical History: No Reported History


Additional Family Medical History / Comment(s): FATHER  AT AGE 70. PT UNSURE

 WHAT EXACTLY HE PASSED AWAY FROM. arthritis. both legs amputated.





  ** Mother


Family Medical History: Congestive Heart Failure (CHF)


Additional Family Medical History / Comment(s): MOTHER  AT AGE 91 YRS.





Medications and Allergies


                                Home Medications











 Medication  Instructions  Recorded  Confirmed  Type


 


amLODIPine BESYLATE [Norvasc] 5 mg PO BID 16 History


 


Omeprazole 20 mg PO DAILY 17 History


 


Clopidogrel [Plavix] 75 mg PO DAILY #30 tab 17 Rx


 


Atorvastatin [Lipitor] 40 mg PO HS 21 History


 


Ergocalciferol [Vitamin D2 (1250 1,250 mcg PO FR 21 History





Mcg = 98287 Iu)]    


 


Aspirin EC [Ecotrin Low Dose] 81 mg PO DAILY 10/24/21 03/14/23 History


 


Thiamine [Vitamin B-1] 100 mg PO DAILY 10/24/21 03/14/23 History


 


OLANZapine 5 mg PO HS 23 History


 


hydrALAZINE HCL [Apresoline] 25 mg PO TID 23 History


 


hydroCHLOROthiazide [Hydrodiuril] 25 mg PO DAILY 23 History








                                    Allergies











Allergy/AdvReac Type Severity Reaction Status Date / Time


 


zolpidem tartrate AdvReac Severe Hallucinati Verified 23 12:12





[From Ambien]   ons  














Sleep Note





- Sleep Note


Sleep Note: 


Temperature: 


Pulse Rate: 


Respiratory Rate: 


Blood Pressure: 


SpO2: 


Height: 


Weight: 


BMI: 


Neck Circumference:

## 2023-11-21 ENCOUNTER — HOSPITAL ENCOUNTER (OUTPATIENT)
Dept: HOSPITAL 47 - 3 N SLEEP | Age: 78
LOS: 1 days | Discharge: HOME | End: 2023-11-22
Payer: MEDICARE

## 2023-11-21 DIAGNOSIS — Z88.8: ICD-10-CM

## 2023-11-21 DIAGNOSIS — G47.33: Primary | ICD-10-CM

## 2023-11-21 DIAGNOSIS — F17.200: ICD-10-CM

## 2023-11-21 PROCEDURE — 95810 POLYSOM 6/> YRS 4/> PARAM: CPT

## 2023-11-22 NOTE — P.PCN
Description of Procedure: 


POLYSOMNOGRAPHY REPORT








PROCEDURE(S)/DATE(S):  Polysomnography 11/21/2023


                                            


CLINICAL:  Patient has been seen in the sleep center for evaluation of 

obstructive sleep apnea-hypopnea syndrome. Please see my consultation.  Sleep 

study has been done for evaluation of patient breathing during the sleep. 





PROCEDURE: The standard montage for clinical polysomnography included the 

electroencephalogram, the electrooculogram, the mentalis surface 

electromyography and Lead II cardiography.  The respiratory battery consisted of

measurements of nasal/buccal air flow, pressure transducer measurements from 

nose, thoracic and/or abdominal effort and intercostal surface electromyography.

 Video monitoring has been done to check for any parasomnia events.  Nocturnal 

oxyhemoglobin saturations were obtained by finger oximetry.  Step-wise titration

with positive airway pressure was utilized to control the respiratory events, if

necessary. 





RESULTS: During the diagnostic sleep study sleep efficiency was normal 95.6 %.  

Latency to sleep onset was normal 13.0 min.  Sleep architecture showed stage NI 

was short 1.1 %, Delta sleep was absent 0 %, REM sleep was normal 20.9 %.  


Respiratory channel showed 2 obstructive apneas, 0 mixed apneas, 1 central 

apneas, 8 hypopneas with lowest oxygen level 81 %.  Total apnea hypopnea index 

was 1.6.  Oxygen level was below or equal 88% for 13.9 minutes


Heart rate was in the range between 57 and 69, average 63. 


EMG showed 0 periodic limb movements per hour with 0 micro-arousals per hour.





IMPRESSIONS:


1.  No significant apneas or hypopneas following today's criteria.


2.  Oxygen level was below or equal 88% for 13.9 minutes.


3.No significant periodic limb movements have been documented.


  


Please see other impressions from consultation





PLAN:  


1.  I will see patient for follow-up visit to explain results of the test and 

recommendations.


2. Losing weight program. 


3. Sleep hygiene with regular time in bed for at least 7-1/2 hours.


4. No driving if feeling sleepiness. 


5.  We will start patient on oxygen supplement during sleep .


  





Thank you very much for allowing me to participate in the management of your 

patient. 








Sincerely, 





Mohan Gupta MD, PhD, FAASM.


Diplomat of American Board of Sleep Medicine, 


Sleep Medicine Board by American Board of Internal Medicine


Medical Director of [facility] [place]

## 2023-12-04 ENCOUNTER — HOSPITAL ENCOUNTER (OUTPATIENT)
Dept: HOSPITAL 47 - LABPAT | Age: 78
Discharge: HOME | End: 2023-12-04
Attending: ORTHOPAEDIC SURGERY
Payer: MEDICARE

## 2023-12-04 DIAGNOSIS — M16.11: ICD-10-CM

## 2023-12-04 DIAGNOSIS — Z01.812: Primary | ICD-10-CM

## 2023-12-04 DIAGNOSIS — Z22.322: ICD-10-CM

## 2023-12-04 PROCEDURE — 86850 RBC ANTIBODY SCREEN: CPT

## 2023-12-04 PROCEDURE — 86900 BLOOD TYPING SEROLOGIC ABO: CPT

## 2023-12-04 PROCEDURE — 86901 BLOOD TYPING SEROLOGIC RH(D): CPT

## 2023-12-04 PROCEDURE — 87070 CULTURE OTHR SPECIMN AEROBIC: CPT

## 2023-12-07 ENCOUNTER — HOSPITAL ENCOUNTER (OUTPATIENT)
Dept: HOSPITAL 47 - LABPAT | Age: 78
Discharge: HOME | End: 2023-12-07
Attending: ORTHOPAEDIC SURGERY
Payer: MEDICARE

## 2023-12-07 DIAGNOSIS — Z01.812: Primary | ICD-10-CM

## 2023-12-07 LAB
APTT BLD: 24 SEC (ref 22–30)
INR PPP: 0.9 (ref ?–1.2)
PT BLD: 9.9 SEC (ref 10–12.5)

## 2023-12-07 PROCEDURE — 85025 COMPLETE CBC W/AUTO DIFF WBC: CPT

## 2023-12-07 PROCEDURE — 85610 PROTHROMBIN TIME: CPT

## 2023-12-07 PROCEDURE — 80053 COMPREHEN METABOLIC PANEL: CPT

## 2023-12-07 PROCEDURE — 85730 THROMBOPLASTIN TIME PARTIAL: CPT

## 2023-12-08 LAB
ALBUMIN SERPL-MCNC: 4.4 G/DL (ref 3.8–4.9)
ALBUMIN/GLOB SERPL: 2 RATIO (ref 1.6–3.17)
ALP SERPL-CCNC: 132 U/L (ref 41–126)
ALT SERPL-CCNC: 18 U/L (ref 8–44)
ANION GAP SERPL CALC-SCNC: 11.1 MMOL/L (ref 4–12)
AST SERPL-CCNC: 19 U/L (ref 13–35)
BASOPHILS # BLD AUTO: 0.09 X 10*3/UL (ref 0–0.1)
BASOPHILS NFR BLD AUTO: 0.9 %
BUN SERPL-SCNC: 17.9 MG/DL (ref 9–27)
BUN/CREAT SERPL: 16.27 RATIO (ref 12–20)
CALCIUM SPEC-MCNC: 10.4 MG/DL (ref 8.7–10.3)
CHLORIDE SERPL-SCNC: 104 MMOL/L (ref 96–109)
CO2 SERPL-SCNC: 26.9 MMOL/L (ref 21.6–31.8)
EOSINOPHIL # BLD AUTO: 0.04 X 10*3/UL (ref 0.04–0.35)
EOSINOPHIL NFR BLD AUTO: 0.4 %
ERYTHROCYTE [DISTWIDTH] IN BLOOD BY AUTOMATED COUNT: 5.13 X 10*6/UL (ref 4.1–5.2)
ERYTHROCYTE [DISTWIDTH] IN BLOOD: 14.1 % (ref 11.5–14.5)
GLOBULIN SER CALC-MCNC: 2.2 G/DL (ref 1.6–3.3)
GLUCOSE SERPL-MCNC: 110 MG/DL (ref 70–110)
HCT VFR BLD AUTO: 45.1 % (ref 37.2–46.3)
HGB BLD-MCNC: 14 G/DL (ref 12–15)
IMM GRANULOCYTES BLD QL AUTO: 0.3 %
LYMPHOCYTES # SPEC AUTO: 3 X 10*3/UL (ref 0.9–5)
LYMPHOCYTES NFR SPEC AUTO: 29.2 %
MCH RBC QN AUTO: 27.3 PG (ref 27–32)
MCHC RBC AUTO-ENTMCNC: 31 G/DL (ref 32–37)
MCV RBC AUTO: 87.9 FL (ref 80–97)
MONOCYTES # BLD AUTO: 0.6 X 10*3/UL (ref 0.2–1)
MONOCYTES NFR BLD AUTO: 5.8 %
NEUTROPHILS # BLD AUTO: 6.51 X 10*3/UL (ref 1.8–7.7)
NEUTROPHILS NFR BLD AUTO: 63.4 %
NRBC BLD AUTO-RTO: 0 X 10*3/UL (ref 0–0.01)
PLATELET # BLD AUTO: 292 X 10*3/UL (ref 140–440)
POTASSIUM SERPL-SCNC: 4.3 MMOL/L (ref 3.5–5.5)
PROT SERPL-MCNC: 6.6 G/DL (ref 6.2–8.2)
SODIUM SERPL-SCNC: 142 MMOL/L (ref 135–145)
WBC # BLD AUTO: 10.27 X 10*3/UL (ref 4.5–10)

## 2023-12-11 NOTE — P.HPOR
History of Present Illness


H&P Date: 23


Chief Complaint: Left hip pain





Patient is a 78-year-old retired female who presents with progressive left hip 

pain for the past several years worsening over the past several months.  She 

notes groin and thigh pain with weightbearing activities.  She's been limping.  

She is having significant night symptoms.  She's tried medications without much 

relief.





Review of Systems





As per HPI





Past Medical History


Past Medical History: Coronary Artery Disease (CAD), COPD, Hyperlipidemia, 

Hypertension, Myocardial Infarction (MI), Thyroid Disorder


Additional Past Medical History / Comment(s): Recent day time fatigue, states 

"Sleep Apnea ruled out, oxygen levels go down at night, need to start sleeping 

with oxygen on but haven't received machine yet." Hx parathyroid nodules, high 

calcium - had parathyroid removed.


Last Myocardial Infarction Date:: Unknown


History of Any Multi-Drug Resistant Organisms: None Reported


Past Surgical History: Heart Catheterization With Stent


Additional Past Surgical History / Comment(s): Cyst removed from back, heart 

cath with a stent placed 2015, parathyroid removed.


Past Anesthesia/Blood Transfusion Reactions: No Reported Reaction


Date of Last Stent Placement:: 2015


Past Psychological History: Anxiety, Depression


Smoking Status: Former smoker


Past Alcohol Use History: Occasional


Additional Past Alcohol Use History / Comment(s): Started smoking at age 16, 1 

PPD, quit 2-3 yrs ago.


Past Drug Use History: None Reported





- Past Family History


  ** Father


Family Medical History: No Reported History


Additional Family Medical History / Comment(s): FATHER  AT AGE 70. PT UNSURE

WHAT EXACTLY HE PASSED AWAY FROM. Had arthritis and both legs amputated.





  ** Mother


Family Medical History: Congestive Heart Failure (CHF)


Additional Family Medical History / Comment(s): MOTHER  AT AGE 91 YRS.





  ** Son(s)


Family Medical History: Cancer


Additional Family Medical History / Comment(s): Brain tumor, .





  ** Daughter(s)


Family Medical History: Deep Vein Thrombosis (DVT)





Medications and Allergies


                                Home Medications











 Medication  Instructions  Recorded  Confirmed  Type


 


amLODIPine BESYLATE [Norvasc] 5 mg PO BID 16 History


 


Omeprazole 20 mg PO QAM 17 History


 


Clopidogrel [Plavix] 75 mg PO DAILY #30 tab 17 Rx


 


Atorvastatin [Lipitor] 40 mg PO HS 04/29/21 12/07/23 History


 


Ergocalciferol [Vitamin D2 (1250 1,250 mcg PO FR 21 History





Mcg = 32843 Iu)]    


 


Aspirin EC [Ecotrin Low Dose] 81 mg PO DAILY 10/24/21 12/07/23 History


 


Thiamine [Vitamin B-1] 100 mg PO DAILY 10/24/21 12/07/23 History


 


OLANZapine 5 mg PO HS 23 History


 


hydrALAZINE HCL [Apresoline] 25 mg PO TID 23 History


 


hydroCHLOROthiazide [Hydrodiuril] 25 mg PO QAM 23 History


 


clonazePAM [KlonoPIN] 0.5 mg PO QAM 23 History








                                    Allergies











Allergy/AdvReac Type Severity Reaction Status Date / Time


 


zolpidem tartrate AdvReac Severe Hallucinati Verified 23 13:03





[From Ambien]   ons  














Physical Examination





- Hip


  ** left


Gait: antalgic


Tenderness with palpation: anterior


Pain with motion: internal rotation and hip flexion


ROM: flexion: 80 degrees


ROM: internal rotation: 0 degrees (Pain)


ROM: external rotation: 60 degrees


Strength: extension: 5/5


Strength: flexion: 5/5


Strength: abduction: 5/5


Tests: impingement tests: positive





Results





Patient is a well-developed well-nourished female approximately 5 foot 3, 170 

pounds of mesomorphic habitus.  HEENT exam is nonfocal, neck is supple.  She has

painful passive motion of the left hip.  Straight leg raise is negative.  

Clinically she has 1 cm shortening of the left lower extremity compared to the r

ight.  Her distal neurovascular exam appears intact in the left lower extremity.





- Diagnostic results


Hip x-ray: image reviewed (2 views of the left hip obtained in the office show 

severe osteoarthrosis with bone-on-bone changes and subchondral sclerosis.)





Assessment and Plan


Assessment: 


Left hip severe osteoarthrosis


Coronary artery disease


COPD





Plan: 





I talked to the patient at length regarding her condition along with treatment 

options.  At this point she is quite symptomatic and limited having pain related

to her left hip osteoarthrosis despite conservative measures.  After a thorough 

discussion she opted to proceed with surgery.  We'll plan to proceed with left 

total hip arthroplasty utilizing an anterior approach.  We will likely 

reinstitute her Plavix postop.  Risks and benefits were discussed at length in 

layman's terms.

## 2023-12-12 ENCOUNTER — HOSPITAL ENCOUNTER (OUTPATIENT)
Dept: HOSPITAL 47 - OR | Age: 78
Setting detail: OBSERVATION
LOS: 2 days | Discharge: HOME HEALTH | End: 2023-12-14
Attending: ORTHOPAEDIC SURGERY | Admitting: ORTHOPAEDIC SURGERY
Payer: MEDICARE

## 2023-12-12 VITALS — BODY MASS INDEX: 30.1 KG/M2

## 2023-12-12 DIAGNOSIS — Z79.82: ICD-10-CM

## 2023-12-12 DIAGNOSIS — I10: ICD-10-CM

## 2023-12-12 DIAGNOSIS — Z87.891: ICD-10-CM

## 2023-12-12 DIAGNOSIS — I25.10: ICD-10-CM

## 2023-12-12 DIAGNOSIS — E66.9: ICD-10-CM

## 2023-12-12 DIAGNOSIS — Z79.899: ICD-10-CM

## 2023-12-12 DIAGNOSIS — J44.9: ICD-10-CM

## 2023-12-12 DIAGNOSIS — E78.5: ICD-10-CM

## 2023-12-12 DIAGNOSIS — F41.8: ICD-10-CM

## 2023-12-12 DIAGNOSIS — D72.829: ICD-10-CM

## 2023-12-12 DIAGNOSIS — M16.12: Primary | ICD-10-CM

## 2023-12-12 DIAGNOSIS — Z95.5: ICD-10-CM

## 2023-12-12 DIAGNOSIS — I25.2: ICD-10-CM

## 2023-12-12 DIAGNOSIS — Z79.02: ICD-10-CM

## 2023-12-12 DIAGNOSIS — G89.18: ICD-10-CM

## 2023-12-12 PROCEDURE — 96375 TX/PRO/DX INJ NEW DRUG ADDON: CPT

## 2023-12-12 PROCEDURE — 94760 N-INVAS EAR/PLS OXIMETRY 1: CPT

## 2023-12-12 PROCEDURE — 97530 THERAPEUTIC ACTIVITIES: CPT

## 2023-12-12 PROCEDURE — 97162 PT EVAL MOD COMPLEX 30 MIN: CPT

## 2023-12-12 PROCEDURE — 96365 THER/PROPH/DIAG IV INF INIT: CPT

## 2023-12-12 PROCEDURE — 97166 OT EVAL MOD COMPLEX 45 MIN: CPT

## 2023-12-12 PROCEDURE — 64447 NJX AA&/STRD FEMORAL NRV IMG: CPT

## 2023-12-12 PROCEDURE — 96376 TX/PRO/DX INJ SAME DRUG ADON: CPT

## 2023-12-12 PROCEDURE — 73501 X-RAY EXAM HIP UNI 1 VIEW: CPT

## 2023-12-12 PROCEDURE — 97116 GAIT TRAINING THERAPY: CPT

## 2023-12-12 PROCEDURE — 85025 COMPLETE CBC W/AUTO DIFF WBC: CPT

## 2023-12-12 PROCEDURE — 97535 SELF CARE MNGMENT TRAINING: CPT

## 2023-12-12 PROCEDURE — 27130 TOTAL HIP ARTHROPLASTY: CPT

## 2023-12-12 RX ADMIN — HYDROMORPHONE HYDROCHLORIDE PRN MG: 1 INJECTION, SOLUTION INTRAMUSCULAR; INTRAVENOUS; SUBCUTANEOUS at 14:05

## 2023-12-12 RX ADMIN — DEXAMETHASONE SODIUM PHOSPHATE ONE MG: 4 INJECTION, SOLUTION INTRAMUSCULAR; INTRAVENOUS at 10:00

## 2023-12-12 RX ADMIN — POTASSIUM CHLORIDE SCH MLS: 14.9 INJECTION, SOLUTION INTRAVENOUS at 09:49

## 2023-12-12 RX ADMIN — POTASSIUM CHLORIDE SCH MLS: 14.9 INJECTION, SOLUTION INTRAVENOUS at 11:33

## 2023-12-12 RX ADMIN — HYDROMORPHONE HYDROCHLORIDE PRN MG: 1 INJECTION, SOLUTION INTRAMUSCULAR; INTRAVENOUS; SUBCUTANEOUS at 14:15

## 2023-12-12 RX ADMIN — DOCUSATE SODIUM AND SENNOSIDES SCH EACH: 50; 8.6 TABLET ORAL at 20:20

## 2023-12-12 RX ADMIN — HYDROMORPHONE HYDROCHLORIDE PRN MG: 1 INJECTION, SOLUTION INTRAMUSCULAR; INTRAVENOUS; SUBCUTANEOUS at 15:51

## 2023-12-12 RX ADMIN — DEXAMETHASONE SODIUM PHOSPHATE ONE: 4 INJECTION, SOLUTION INTRAMUSCULAR; INTRAVENOUS at 18:38

## 2023-12-12 RX ADMIN — HYDROCODONE BITARTRATE AND ACETAMINOPHEN PRN EACH: 7.5; 325 TABLET ORAL at 21:38

## 2023-12-12 NOTE — P.ANPRN
Procedure Note - Anesthesia





- Nerve Block Performed


  ** Left Art Single


Time Out Performed: Yes


Date of Procedure: 12/12/23


Procedure Start Time: 10:49


Procedure Stop Time: 10:53


Indication: Acute Post-Operative Pain, Dx/Pain Location, Requested by Surgeon


Sedation Type: Sedate with meaningful contact maintained


Preparation: Sterile Prep


Position: Supine


Catheter: None


Needle Types: Pajunk


Needle Gauge: 21


Ultrasound used to visualize needle placement: Yes


Ultrasound used to observe medication spread: Yes


Injectate: 0.5% Ropivacaine (see comment for volume) (20cc)


Blood Aspirated: No


Pain Paresthesia on Injection Noted: No


Resistance on Injection: Normal


Image Stored and Saved: Yes


Events: Uneventful and Well Tolerated

## 2023-12-12 NOTE — XR
EXAMINATION TYPE: XR Hip Limited LT

 

DATE OF EXAM: 12/12/2023

 

Comparison: None

 

Clinical History: 78-year-old female Status post hip surgery, assess surgical alignment

 

Findings:

Overpenetrated exam. The single image shows placement of left total hip arthroplasty. Both are at the
 cerebellar cup and femoral stem components are well seated without periprosthetic fracture. Alignmen
t grossly anatomic. Soft tissue air related to recent operation.

 

 

Impression:

Uncomplicated postoperative appearance left total hip arthroplasty.

## 2023-12-12 NOTE — P.OP
Date of Procedure: 12/12/23


Preoperative Diagnosis: 


Left hip severe osteoarthrosis


Postoperative Diagnosis: 


Same


Procedure(s) Performed: 


Left total hip arthroplastyanterior approachpress-fit


Implants: 


Depuy Corail size 12/135 collared press-fit femoral stem, 36+1.5 ceramic 

femoral head, 54 mm Melcher [place] acetabular shell with neutral polyethylene liner


Anesthesia: JEANNE


Surgeon: Ulises Holliday


Assistant #1: Manjit Conway


Estimated Blood Loss (ml): 200


Pathology: none sent


Condition: stable


Disposition: PACU


Indications for Procedure: 


The patient is 78-year-old female who presents with persistent/progressive left 

hip pain secondary to osteoarthrosis despite conservative measures.  A 

discussion of the risks and benefits of operative intervention versus continued 

conservative measures was made with the patient.  She opted to proceed.  

Operative risks to include infection, neurovascular injury, fracture, 

development of blood clots, possible leg length discrepancy, possible component 

loosening/failure and need for subsequent procedures was discussed.  Informed 

consent was obtained.


Operative Findings: 


As below


Description of Procedure: 


The patient was brought to the operating room, and after induction of spinal 

anesthesia was placed supine on the Angelica table.  Positioning was checked with 

fluoroscopy.  The left hip was then prepped and draped in a normal fashion.  A 

12 cm incision was then made starting 2 fingerbreadths distal and 3 finger 

breaths posterior to the ASIS in line with the proximal femur.  The skin was 

incised sharply.  Subcutaneous tissues were divided sharply.  Electrocautery was

used for hemostasis.  The fascia was split in line with skin incision.  The 

interval between the sartorius and tensor fascia keshawn was then bluntly 

developed.  The posterior fascia was opened with electrocautery.  The lateral 

circumflex vessels were identified and cauterized prior to sectioning.  A 

retractor was placed along the superior femoral neck as well as the anterior 

acetabular rim.  A wide capsulotomy was performed.  The neck cut was then made 

at a 45  angle to the shaft approximately 1 1/2 cm above the level of the 

lesser trochanter.  The head was extracted.  Attention was then paid towards 

preparing the acetabular.  Anterior and posterior retractors were placed.  The 

remaining capsular labral tissue sharply debrided clearly defining the 

acetabular margins.  I began reaming with a 47 mm reamer taking care to 

initially medialize then reaming at 45  of abduction and 20  of anteversion.  

Sequential reaming is performed up to 53 mm.  A trial 54 mm acetabular shell was

inserted in the same orientation and was fully seated.  There was good rim fit 

and stability.  Positioning was checked with fluoroscopy.  The final 54 mm 

acetabular shell was inserted again at 45  of abduction and 20  of 

anteversion.  This was fully seated.  There was good rim fit and stability.  

Again fluoroscopy was used to check the adequacy of placement.  A neutral 

polyethylene liner was gently impacted.  Care was taken to avoid any soft tissue

interposition.  Pulsatile lavage was utilized.  Attention was then paid towards 

preparing the proximal femur.  The central region was cleared of soft tissue.  A

canal finder was used to find the femoral canal.  Sequential broaching was 

performed up to size 12 taking care to lateralize proximally.  A calcar mill was

used to fashion the medial calcar.  There was good rotational stability.  A 

standard neck along with a 36+1.5 mm  head was placed.  The hip was gently 

reduced.  Fluoroscopy was used to check the adequacy of positioning along with 

leg lengths.  I felt both were good.  The hip was gently dislocated.  The trial 

components were removed.  The final size 12 collared standard press-fit femoral 

stem was inserted parallel to the posterior cortex.  This was fully seated and 

there was good rotational stability.  A  36 mm + 1.5 ceramic head was placed.  

This was gently impacted.  The hip was then gently reduced.  Final fluoroscopic 

view showed adequate placement implant along with restoration of leg length.  

Stability was checked with 80  of external rotation and 60  of extension of 

the right hip.  The wound was irrigated with sterile lavage.  The fascia was 

closed with running 0 Vicryl suture.  There was minimal drainage therefore a 

deep drain was not placed.  The second dose of IV TXA was given.     The 

subcutaneous tissues were reapproximated interrupted 2-0 Vicryl sutures.  The 

skin was reapproximated  with 3-0 subcuticular strata  fix suture.  Skin tape 

and adhesive was applied.  A sterile dressing was applied.  The patient was then

awoken from sedation and transferred to recovery room in good condition.  Blood 

loss was estimated at  200 mL.  No complications were incurred.  Sponge and 

needle counts were correct at the end of the case.  Manjit WYNN assisted 

during the major components is case to include exposure, bone resection, 

implantation, and closure.

## 2023-12-12 NOTE — XR
EXAMINATION TYPE: XR Hip Limited LT, FL guidance operating room

 

DATE OF EXAM: 12/12/2023

 

Comparison: None

 

Clinical History: 78-year-old female LEFT ANTERIOR HIP

 

Findings:

LT anterior hip with Mg. 19 sec fluoro time. 0.9525 mGycm2 DAP. 6 images are widened.

 

 

 

Impression:

Intraoperative fluoroscopy as above.

## 2023-12-13 LAB
BASOPHILS # BLD AUTO: 0.01 X 10*3/UL (ref 0–0.1)
BASOPHILS NFR BLD AUTO: 0.1 %
EOSINOPHIL # BLD AUTO: 0 X 10*3/UL (ref 0.04–0.35)
EOSINOPHIL NFR BLD AUTO: 0 %
ERYTHROCYTE [DISTWIDTH] IN BLOOD BY AUTOMATED COUNT: 3.35 X 10*6/UL (ref 4.1–5.2)
ERYTHROCYTE [DISTWIDTH] IN BLOOD: 13.7 % (ref 11.5–14.5)
HCT VFR BLD AUTO: 29.1 % (ref 37.2–46.3)
HGB BLD-MCNC: 9.3 G/DL (ref 12–15)
IMM GRANULOCYTES BLD QL AUTO: 0.4 %
LYMPHOCYTES # SPEC AUTO: 1.75 X 10*3/UL (ref 0.9–5)
LYMPHOCYTES NFR SPEC AUTO: 14.9 %
MCH RBC QN AUTO: 27.8 PG (ref 27–32)
MCHC RBC AUTO-ENTMCNC: 32 G/DL (ref 32–37)
MCV RBC AUTO: 86.9 FL (ref 80–97)
MONOCYTES # BLD AUTO: 0.82 X 10*3/UL (ref 0.2–1)
MONOCYTES NFR BLD AUTO: 7 %
NEUTROPHILS # BLD AUTO: 9.08 X 10*3/UL (ref 1.8–7.7)
NEUTROPHILS NFR BLD AUTO: 77.6 %
NRBC BLD AUTO-RTO: 0 X 10*3/UL (ref 0–0.01)
PLATELET # BLD AUTO: 263 X 10*3/UL (ref 140–440)
WBC # BLD AUTO: 11.71 X 10*3/UL (ref 4.5–10)

## 2023-12-13 RX ADMIN — HYDROCODONE BITARTRATE AND ACETAMINOPHEN PRN EACH: 7.5; 325 TABLET ORAL at 03:16

## 2023-12-13 RX ADMIN — DOCUSATE SODIUM AND SENNOSIDES SCH EACH: 50; 8.6 TABLET ORAL at 20:26

## 2023-12-13 RX ADMIN — HYDROCODONE BITARTRATE AND ACETAMINOPHEN PRN EACH: 7.5; 325 TABLET ORAL at 08:39

## 2023-12-13 RX ADMIN — POTASSIUM CHLORIDE SCH: 14.9 INJECTION, SOLUTION INTRAVENOUS at 05:37

## 2023-12-13 RX ADMIN — CLOPIDOGREL BISULFATE SCH MG: 75 TABLET ORAL at 08:39

## 2023-12-13 NOTE — P.PN
Subjective


Progress Note Date: 12/13/23


Principal diagnosis: 


Left hip osteoarthritis





Patient was seen at bedside this morning sitting up in chair with legs elevated.

 Dressing of present over left hip.  Patient says she did work with therapy this

morning and walked around the room and into the hallway.  Patient notes after 

she took the first step onto the stairs she felt like her knee was about buccal 

and palatal bit unsteady.  Patient says her pain is tolerable while she is 

sitting in chair.  Patient says she does get increased pain when she gets up and

walks around the room.  Patient says she does have a walker for home.  Patient 

says she has urinated several times since surgery yesterday.  Patient is hoping 

to stay one more in hospital.  PT recommending patient to stay one more in 

hospital as well.  Patient denies chest pain, fever, shortness breath, nausea, 

vomiting, change in vision, loss of bowel/bladder control.








Objective





- Vital Signs


Vital signs: 


                                   Vital Signs











Temp  97.6 F   12/13/23 07:21


 


Pulse  72   12/13/23 07:21


 


Resp  18   12/13/23 07:21


 


BP  129/67   12/13/23 07:21


 


Pulse Ox  90 L  12/13/23 07:21


 


FiO2      








                                 Intake & Output











 12/12/23 12/13/23 12/13/23





 18:59 06:59 18:59


 


Intake Total 1951 590 


 


Output Total 200  


 


Balance 1751 590 


 


Weight 78.9 kg  


 


Intake:   


 


  IV 1951  


 


  Intake, IV Titration  240 





  Amount   


 


    Lactated Ringers 1,000 ml  240 





    @ 20 mls/hr IV .Q24H [facility]   





    Rx#:[id number]   


 


  Oral  350 


 


Output:   


 


  Estimated Blood Loss 200  


 


Other:   


 


  # Voids  2 














- Exam


Left hip:


Incision is clean, dry, and intact.  The exofin fusion tape is in good 

condition.  There is minimal soft tissue swelling and ecchymosis surrounding the

medial and lateral aspects of the incision.  Calf is soft, no tenderness with 

palpation.  Plantar flexion, dorsiflexion, EHL, FHL are intact.  Sensory exam to

light touch throughout the extremity is intact, dorsal pedis pulses 2+.








Assessment and Plan


Assessment: 


1.  Left hip osteoarthritis





- Postop day #1 status post left total hip arthroplasty





Plan: 


1.  Left hip osteoarthritis - left total hip arthroplasty performed yesterday, 

Tuesday, 12/12/2023.  Patient stable at bedside this morning.  Plan to keep 

patient 1 more night for additional PT and pain control.  Patient does have a 

walker at home.  Plan for discharge home tomorrow with health services.





2.  Appreciate medical management





3.  Pain management - Norco





4.  GI prophylaxis - senna





5.  DVT prophylaxis - Plavix





6.  PT/OT - weightbearing as tolerated with walker





7.  Encourage incentive spirometer use





8.  Discharge planning - Plan for discharge home tomorrow with health services 

pending improvement on the stairs of PT/OT.


Time with Patient: Less than 30

## 2023-12-13 NOTE — P.CONS
History of Present Illness





- Reason for Consult


Consult date: 23


Medical management, status post left hip arthroplasty





- History of Present Illness








This is a 78-year-old female who was recently admitted under orthopedic services

status post left hip total arthroplasty and is postop day 1.  Patient follows 

with Dr. Slaughter in the outpatient setting with past medical history of corona

ry artery disease, COPD, hyperlipidemia, hypertension, myocardial infarctions, 

thyroid disorder, recent sleep apnea ruled out although patient has been having 

low oxygen levels at night and awaiting to receive home oxygen, anxiety with 

depression and a former smoker.  Patient did require 2 L of oxygen although is 

currently on room air at 93% and denies any significant shortness of breath.  

Patient has been given incentive spirometer and instructed to continue using at 

least 10 times every hour while awake.  Patient did have postoperative labs 

drawn which showed a WBC of 11.71 which is likely reactive with a hemoglobin is 

stable at 9.3.  Patient reports she did undergo presurgical clearance and 

everything was fine.  Patient is afebrile with no reports of chest pain or 

palpitations.  Patient reports voiding with no difficulties and has been passing

gas.  Patient tolerating diet and only experienced one episode of nausea 

postoperatively.  Patient was evaluated by physical therapy and did not do well 

on the stairs and reports has 6 stairs to get up into her home and plans on 

going home.  Patient will be reevaluated by physical therapy in the morning and 

continue with pain management per orthopedics.  Medicine was consulted regarding

medication management.  She takes a number of blood pressure medications 

including hydrochlorothiazide, hydralazine and Norvasc and will hold to monitor 

for postoperative hypotension.  Blood pressures are currently normotensive.





Review Of Systems:


Constitutional: No fever, no chills, no night sweats.  No weight change.  No 

weakness, fatigue or lethargy.  No daytime sleepiness.


EENT: No headache.  No blurred vision or double vision, no loss of vision.  No 

loss of Hearing, no ringing in the ears, no dizziness.  No nasal drainage or 

congestion.  No epistaxis.  No sore throat.


Lungs: No shortness of breath, cough, no sputum production.  No wheezing.


Cardiovascular: No chest pain, no lower extremity edema.  No palpitations.  No 

paroxysmal nocturnal dyspnea.  No orthopnea.  No lightheadedness or dizziness.  

No syncopal episodes.


Abdominal: No abdominal pain.  No nausea, vomiting.  No diarrhea.  No 

constipation.  No bloody or tarry stools..  No loss of appetite.


Genitourinary: No dysuria, increased frequency, urgency.  No urinary retention.


Musculoskeletal: No myalgias.  No muscle weakness, no gait dysfunction, no 

frequent falls.  No back pain.  No neck pain.  Reports left hip pain and 

discomfort


Integumentary: No wounds, no lesions.  No rash or pruritus.  No unusual 

bruising.  No change in hair or nails.


Neurologic: No aphasia. No facial droop. No change in mentation. No head injury.

No headache. No paralysis. No paresthesia.


Psychiatric: No depression.  No anxiety.  No mood swings.


Endocrine: No abnormal blood sugars.  No weight change.  No excessive sweating 

or thirst.  No cold intolerance.  











PHYSICAL EXAMINATION: 





GENERAL: The patient is alert and oriented x4,  Well developed, well nourished. 

Obese


HEENT: Pupils are round and equally reacting to light. EOMI. no scleral icterus.

No conjunctival pallor. Normocephalic, atraumatic. No pharyngeal erythema. No 

thyromegaly.  


CARDIOVASCULAR: S1 and S2  muffled 


PULMONARY: diminished breath sounds bilaterally otherwise clear to auscultation 

with no wheezing or rhonchi noted. 


ABDOMEN: soft.  Nontender on exam.  obese. non-distended, normoactive bowel 

sounds. No palpable organomegaly. 


MUSCULOSKELETAL: No joint swelling or deformity.


EXTREMITIES: No cyanosis, clubbing, or pedal edema.  Left hip surgical incision 

site is clean dry and intact with no significant erythema noted.  Minimal 

swelling noted and soft and palpable


NEUROLOGICAL: Gross neurological examination did not reveal any focal deficits. 

Diffuse weakness


SKIN: No rashes. 





Assessment:





Status post left total hip arthroplasty


COPD, not in exacerbation


Leukocytosis, mild, likely reactive given patient's recent surgery


Hyperlipidemia


Hypertension


Previous myocardial infarction with stenting


History of anxiety and depression


History of coronary artery disease


Former smoker


Obesity with a BMI of 30.8


GI prophylaxis


DVT prophylaxis


Full code





Plan:





Recommend to continue with current medications and management per orthopedic 

services.  Pain management and DVT prophylaxis per orthopedics


Home medications reviewed and resumed and recommend holding blood pressure 

medications to monitor for postoperative hypotension read patient is currently 

normotensive


patient reports she has history of COPD and was scheduled to receive outpatient 

O2 at 2-3 L at night as patient has been having low pulse ox readings and 

underwent sleep study reporting she does not have sleep apnea.  Awaiting heart 

medical to improve her oxygen


Incentive spirometer ordered encourage the patient continue using at least 10 

times every hour while awake


Encourage the patient to get up out of the bed more often and setup in the chair

with all meals


PT/OT evaluated the patient recommending monitoring and reevaluation as patient 

was unable to tolerate the stairs and has 6 stairs to get up and in her house 

and plans on going home with family.


We will continue to follow with orthopedics during hospitalization.  Thank you jose dobson for this consultation








The impression and plan of care has been dictated by Shonna Queen, nurse 

practitioner as directed.





Dr. Virginia MD


I have performed a history and examination and MDM of this patient, discussed 

the same with the dictator, and  agree with the dictator's assessment and plan 

as written ,documented as a scribe. Based on total visit time,  I have performed

more than 50% of the visit. Any additional findings or plans will be noted. 





Past Medical History


Past Medical History: Coronary Artery Disease (CAD), COPD, Hyperlipidemia, 

Hypertension, Myocardial Infarction (MI), Thyroid Disorder


Additional Past Medical History / Comment(s): Recent day time fatigue, states 

"Sleep Apnea ruled out, oxygen levels go down at night, need to start sleeping 

with oxygen on but haven't received machine yet." Hx parathyroid nodules, high 

calcium - had parathyroid removed.


Last Myocardial Infarction Date:: Unknown


History of Any Multi-Drug Resistant Organisms: None Reported


Past Surgical History: Heart Catheterization With Stent


Additional Past Surgical History / Comment(s): Cyst removed from back, heart 

cath with a stent placed 2015, parathyroid removed.


Past Anesthesia/Blood Transfusion Reactions: No Reported Reaction


Date of Last Stent Placement:: 2015


Past Psychological History: Anxiety, Depression


Additional Psychological History / Comment(s): PT LIVES AT HOME AND HAS AN ADULT

GRANDSON WHO LIVES WITH HER. SHE IS INDEPENDENT IN ALL ADL'S


Smoking Status: Former smoker


Past Alcohol Use History: Occasional


Additional Past Alcohol Use History / Comment(s): Started smoking at age 16, 1 

PPD, quit 2-3 yrs ago.


Past Drug Use History: None Reported





- Past Family History


  ** Father


Family Medical History: No Reported History


Additional Family Medical History / Comment(s): FATHER  AT AGE 70. PT UNSURE

WHAT EXACTLY HE PASSED AWAY FROM. Had arthritis and both legs amputated.





  ** Mother


Family Medical History: Congestive Heart Failure (CHF)


Additional Family Medical History / Comment(s): MOTHER  AT AGE 91 YRS.





  ** Son(s)


Family Medical History: Cancer


Additional Family Medical History / Comment(s): Brain tumor, .





  ** Daughter(s)


Family Medical History: Deep Vein Thrombosis (DVT)





Medications and Allergies


                                Home Medications











 Medication  Instructions  Recorded  Confirmed  Type


 


amLODIPine BESYLATE [Norvasc] 5 mg PO BID 16 History


 


Omeprazole 20 mg PO QAM 17 History


 


Clopidogrel [Plavix] 75 mg PO DAILY #30 tab 17 Rx


 


Atorvastatin [Lipitor] 40 mg PO HS 21 History


 


Ergocalciferol [Vitamin D2 (1250 1,250 mcg PO FR 21 History





Mcg = 30866 Iu)]    


 


Aspirin EC [Ecotrin Low Dose] 81 mg PO DAILY 10/24/21 12/07/23 History


 


Thiamine [Vitamin B-1] 100 mg PO DAILY 10/24/21 12/12/23 History


 


OLANZapine 5 mg PO HS 23 History


 


hydrALAZINE HCL [Apresoline] 25 mg PO TID 23 History


 


hydroCHLOROthiazide [Hydrodiuril] 25 mg PO QAM 23 History


 


clonazePAM [KlonoPIN] 0.5 mg PO QAM 23 History








                                    Allergies











Allergy/AdvReac Type Severity Reaction Status Date / Time


 


zolpidem tartrate AdvReac Severe Hallucinati Verified 23 09:37





[From Ambien]   ons  














Physical Exam


Vitals: 


                                   Vital Signs











  Temp Pulse Resp BP Pulse Ox


 


 23 07:21  97.6 F  72  18  129/67  90 L


 


 23 01:16  97.5 F L  67  17  126/62  92 L


 


 23 20:32  97.4 F L  74  18  121/70  96


 


 23 20:14      96


 


 23 16:30   56 L  12  118/56  93 L


 


 23 16:00   55 L  12  125/60  95


 


 23 15:30   87  12  122/59  97


 


 12/12/23 15:00   57 L  12  128/56  97


 


 23 14:45   56 L  12  123/59  100


 


 23 14:30   62  12  114/53  100


 


 23 14:15   64  16  119/63  100


 


 23 14:00   77  16  115/58  100


 


 23 13:47  97.8 F  92  16  115/66  98


 


 23 09:49  97.8 F  66  16  169/72  95








                                Intake and Output











 23





 22:59 06:59 14:59


 


Intake Total  590 


 


Balance  590 


 


Intake:   


 


  Intake, IV Titration  240 





  Amount   


 


    Lactated Ringers 1,000 ml  240 





    @ 20 mls/hr IV [facility]   





    Rx#:[id number]   


 


  Oral  350 


 


Other:   


 


  # Voids 1 2 


 


  Weight 78.9 kg  














Results


CBC & Chem 7: 


                                 23 06:36

## 2023-12-14 VITALS — TEMPERATURE: 98.6 F | HEART RATE: 77 BPM | SYSTOLIC BLOOD PRESSURE: 145 MMHG | DIASTOLIC BLOOD PRESSURE: 66 MMHG

## 2023-12-14 VITALS — RESPIRATION RATE: 17 BRPM

## 2023-12-14 RX ADMIN — HYDROCODONE BITARTRATE AND ACETAMINOPHEN PRN EACH: 7.5; 325 TABLET ORAL at 08:31

## 2023-12-14 RX ADMIN — POTASSIUM CHLORIDE SCH: 14.9 INJECTION, SOLUTION INTRAVENOUS at 06:09

## 2023-12-14 RX ADMIN — CLOPIDOGREL BISULFATE SCH MG: 75 TABLET ORAL at 08:31

## 2023-12-14 RX ADMIN — HYDROCODONE BITARTRATE AND ACETAMINOPHEN PRN EACH: 7.5; 325 TABLET ORAL at 13:23

## 2023-12-14 NOTE — P.PN
Subjective


Progress Note Date: 12/14/23


Principal diagnosis: 





Status post direct anterior left hip arthroplasty





 evaluated today at bedside, she is resting in her hospital chair.  She 

states the pain is a little bit better yesterday.  She is hoping to work with 

physical therapy today.  Plan is for discharge to home later today





Objective





- Vital Signs


Vital signs: 


                                   Vital Signs











Temp  98.6 F   12/14/23 07:44


 


Pulse  77   12/14/23 08:32


 


Resp  17   12/14/23 08:32


 


BP  145/66   12/14/23 07:44


 


Pulse Ox  94 L  12/14/23 07:44


 


FiO2      








                                 Intake & Output











 12/13/23 12/14/23 12/14/23





 18:59 06:59 18:59


 


Intake Total  650 


 


Balance  650 


 


Intake:   


 


  Oral  650 


 


Other:   


 


  Voiding Method Toilet  Toilet


 


  # Voids 5 2 


 


  # Bowel Movements 1  














- Exam





Left lower extremity:





Incision is clean, dry, and intact.  The exofin fusion tape is in good 

condition.  There is minimal soft tissue swelling and ecchymosis surrounding the

medial and lateral aspects of the incision.  Calf is soft, no tenderness with 

palpation.  Plantar flexion, dorsiflexion, EHL, FHL are intact.  Sensory exam to

light touch throughout the extremity is intact, dorsal pedis pulses 2+.








- Labs


CBC & Chem 7: 


                                 12/13/23 06:36





Labs: 


                  Abnormal Lab Results - Last 24 Hours (Table)











  12/13/23 Range/Units





  06:36 


 


WBC  11.71 H  (4.50-10.00)  X 10*3/uL


 


RBC  3.35 L  (4.10-5.20)  X 10*6/uL


 


Hgb  9.3 L  (12.0-15.0)  g/dL


 


Hct  29.1 L  (37.2-46.3)  %


 


Immature Gran #  0.05 H  (0.00-0.04)  X 10*3/uL


 


Neutrophils #  9.08 H  (1.80-7.70)  X 10*3/uL


 


Eosinophils #  0 L  (0.04-0.35)  X 10*3/uL














Assessment and Plan


Assessment: 





Postoperative day #1 status post direct anterior left total hip arthroplasty


Plan: 





Pain control, continue with Norco 7.5 mg/325 mg





DVT prophylaxis, continue aspirin and Plavix





Wound care instructions discussed





Continue work with physical therapy





Medical recommendations





Discharge planning: Plan for discharge home today with home health care


Time with Patient: Less than 30

## 2023-12-14 NOTE — P.DS
Providers


Date of admission: 


12/12/2023





Expected date of discharge: 12/13/23


Attending physician: 


Ulises Holliday





Consults: 





                                        





12/12/23 13:34


Consult Physician Routine 


   Consulting Provider: Ann Miranda


   Consult Reason/Comments: medical management s/p left total hip arthroplasty 

(anterior)


   Do you want consulting provider notified?: Yes











Primary care physician: 


Sylwia Slaughter





Hospital Course: 


Date of admission: 12/12/2023


Date of discharge: 12/14/2023





Admission diagnosis: Left hip osteoarthritis


Discharge diagnosis: Same





Attending physician: Dr. Holliday





Surgical procedures: Left total hip arthroplasty





Brief history: Patient is a 78-year-old female with a history of progressive 

primary left hip posterior arthritis.  At this point patient has failed 

conservative treatment measures and has opted to proceed with a elective left 

total hip arthroplasty.





Hospital course: Details of patient's surgery can be found in operative report. 

Patient tolerated the procedure well and was subsequently transported to 

orthopedic floor.  Patient's orthopeidc and medical care was provided daily. 

Patient had daily laboratory tests performed for evaluation of overall blood 

counts.  Patient had daily physical therapy to include strengthening range of 

motion as well as education with walker ambulation. Patient was treated with 

Plavix for their postoperative DVT prophylaxis during their inpatient stay.  

Patient was noted to have a relatively uneventful postoperative course.  Patient

reported satisfactory pain control with oral pain medications by postoperative 

day 2.  Patient showed satisfactory progress with physical therapy.  Patient 

moved steadily through the program and had no difficulty meeting the goals by 

postoperative day 2.  Given patient's otherwise satisfactory course and having 

met physical therapy goals, plan is to discharge patient home with health 

services on postoperative day 2.





Discharge condition/disposition: Patient will be discharged home with health 

services in stable condition.





Discharge medications: Instructions are given on resumption of patient's normal 

daily medications per primary care recommendation, in addition patient will be 

prescribed Norco; senna.  Resume Plavix at home for DVT prophylaxis





Discharge instructions:


1.  Wound care and infection precautions, keep incision dry and covered while 

showering, no lotions, creams, moisturizers. No soaking, tubs, pools, hottubs. 

Do not scrub over the incision.


2.  Weight-bear as tolerated with walker / cane until follow-up.


3.  Ice and elevate when necessary.  Do not exceed 20 minutes per hour with ice 

pack.


4.  Utilize compression sleeve until seen at first follow up appointment.


5.  Visiting nursing care.


6.  Home physical therapy.


7.  Pain meds and anticoagulants per prescription.


8.  Pain medication has potential to cause constipation. Increase oral fluid and

fiber intake. Contact primary care provider if you have not had a bowel movement

within 48 hours after discharge


9.  No anti-inflammatory medication until discussed at first post operative 

visit, this including Motrin, Aleve, Mobic, Diclofenac.


10.  Follow up in office at 2 weeks postop with Domenic España PA-C / Manjit Conway PA-C


11. Follow up with your primary care doctor 7-10 days after discharge.


12. Contact [facility] with any questions, [phone].











Assessment: 


Left hip osteoarthritis


Procedures: 


Left total hip arthroplasty





Patient Condition at Discharge: Good





Plan - Discharge Summary


Discharge Rx Participant: No


New Discharge Prescriptions: 


New


   HYDROcodone/APAP 7.5-325MG [Norco 7.5-325] 1 - 2 tab PO Q6HR PRN #32 tab


     PRN Reason: Pain


   Sennosides/Docusate Sodium [Senna Plus 8.6-50 mg Softgel] 1 each PO DAILY #20

capsule





No Action


   amLODIPine BESYLATE [Norvasc] 5 mg PO BID


   Omeprazole 20 mg PO QAM


   Clopidogrel [Plavix] 75 mg PO DAILY #30 tab


   Thiamine [Vitamin B-1] 100 mg PO DAILY


   Aspirin EC [Ecotrin Low Dose] 81 mg PO DAILY


   hydroCHLOROthiazide [Hydrodiuril] 25 mg PO QAM


   Atorvastatin [Lipitor] 40 mg PO HS


   Ergocalciferol [Vitamin D2 (1250 Mcg = 24875 Iu)] 1,250 mcg PO FR


   hydrALAZINE HCL [Apresoline] 25 mg PO TID


   OLANZapine 5 mg PO HS


   clonazePAM [KlonoPIN] 0.5 mg PO QAM


Discharge Medication List





amLODIPine BESYLATE [Norvasc] 5 mg PO BID 07/12/16 [History]


Omeprazole 20 mg PO QAM 11/22/17 [History]


Clopidogrel [Plavix] 75 mg PO DAILY #30 tab 11/23/17 [Rx]


Atorvastatin [Lipitor] 40 mg PO HS 04/29/21 [History]


Ergocalciferol [Vitamin D2 (1250 Mcg = 11129 Iu)] 1,250 mcg PO FR 05/16/21 

[History]


Aspirin EC [Ecotrin Low Dose] 81 mg PO DAILY 10/24/21 [History]


Thiamine [Vitamin B-1] 100 mg PO DAILY 10/24/21 [History]


OLANZapine 5 mg PO HS 03/13/23 [History]


hydrALAZINE HCL [Apresoline] 25 mg PO TID 03/13/23 [History]


hydroCHLOROthiazide [Hydrodiuril] 25 mg PO QAM 03/13/23 [History]


clonazePAM [KlonoPIN] 0.5 mg PO QAM 12/07/23 [History]


HYDROcodone/APAP 7.5-325MG [Norco 7.5-325] 1 - 2 tab PO Q6HR PRN #32 tab 

12/14/23 [Rx]


Sennosides/Docusate Sodium [Senna Plus 8.6-50 mg Softgel] 1 each PO DAILY #20 

capsule 12/14/23 [Rx]








Follow up Appointment(s)/Referral(s): 


[facility], [NON-STAFF] - 1-2 Days ([facility] will call you to 

schedule your in home nursing and physical therapy visits. )


Manjit Conway, NEHEMIAH [PHYSICIAN ASSISTANT] - 12/28/23 8:50 am


Patient Instructions/Handouts:  Anterior Hip Replacement (DC), Anterior Hip 

Replacement (GEN)


Activity/Diet/Wound Care/Special Instructions: 


Orthopedic Discharge Instructions:


1.  Wound care and infection precautions, keep incision dry and covered while 

showering, no lotions, creams, moisturizers. No soaking, pools, hot tubs. Do not

scrub over incision.


2.  Weight-bear as tolerated with walker / cane until follow-up.


3.  Ice and elevate when necessary.  Do not exceed 20 minutes per hour with ice 

pack.


4.  Utilize compression sleeve until seen at first follow up appointment.


5.  Pain meds and anticoagulants per prescription.


6.  Pain medication has potential to cause constipation. Increase oral fluid and

fiber intake. Contact primary care provider if you have not had a bowel movement

within 48 hours after discharge.


7.  No anti-inflammatory medication until discussed at first post operative 

visit, this including Motrin, Aleve, Mobic, Diclofenac.


8. Follow up in office at 2 weeks postop with Domenic España PA-C / Manjit Conway PA-C


9. Follow up with your primary care doctor 7-10 days after discharge.


10. Contact [facility] with any questions, [phone].





Keep incision clean, dry, intact.  While showering, cover fusion tape with Saran

wrap.  Keep fusion tape on until follow-up appointment in office in 2 weeks


Discharge Disposition: HOME WITH HOME HEALTH SERVICES

## 2023-12-16 NOTE — P.PN
Subjective


Progress Note Date: 12/14/23











- Reason for Consult


Consult date: 12/13/23


Medical management, status post left hip arthroplasty





- History of Present Illness








This is a 78-year-old female who was recently admitted under orthopedic services

status post left hip total arthroplasty and is postop day 1.  Patient follows 

with Dr. Slaughter in the outpatient setting with past medical history of 

coronary artery disease, COPD, hyperlipidemia, hypertension, myocardial infarc

tions, thyroid disorder, recent sleep apnea ruled out although patient has been 

having low oxygen levels at night and awaiting to receive home oxygen, anxiety 

with depression and a former smoker.  Patient did require 2 L of oxygen although

is currently on room air at 93% and denies any significant shortness of breath. 

Patient has been given incentive spirometer and instructed to continue using at 

least 10 times every hour while awake.  Patient did have postoperative labs 

drawn which showed a WBC of 11.71 which is likely reactive with a hemoglobin is 

stable at 9.3.  Patient reports she did undergo presurgical clearance and 

everything was fine.  Patient is afebrile with no reports of chest pain or 

palpitations.  Patient reports voiding with no difficulties and has been passing

gas.  Patient tolerating diet and only experienced one episode of nausea 

postoperatively.  Patient was evaluated by physical therapy and did not do well 

on the stairs and reports has 6 stairs to get up into her home and plans on 

going home.  Patient will be reevaluated by physical therapy in the morning and 

continue with pain management per orthopedics.  Medicine was consulted regarding

medication management.  She takes a number of blood pressure medications 

including hydrochlorothiazide, hydralazine and Norvasc and will hold to monitor 

for postoperative hypotension.  Blood pressures are currently normotensive.





12/14/2023


Patient is seen in follow-up today with no acute overnight issues noted.  Blood 

pressures being controlled and patient instructed to follow-up and monitor blood

pressures and resume home medication dosing.  Patient plans on going home today 

reports improved with physical therapy and has been cleared by orthopedics for 

discharge.  Patient is medically stable and instructed to follow-up with primary

care provider as well.  Patient is currently afebrile with no reported chest 

pain or shortness of breath.  Patient tolerated diet with no reported nausea or 

vomiting.  Patient has incentive spirometer at the bedside and encourage the 

patient to take home and continue using while awake.





Review of systems:


Constitutional: No reports of fatigue, fever, or chills


Cardiovascular: No reports of chest pain or palpitations


Respiratory: No reports of shortness of breath or cough


GI: No reports of nausea, vomiting, or diarrhea


: No reports of dysuria or retention


Neurovascular: No reports of weakness or numbness





All medications have been reviewed











PHYSICAL EXAMINATION: 





GENERAL: The patient is alert and oriented x4,  Well developed, well nourished. 

Obese


HEENT: Pupils are round and equally reacting to light. EOMI. no scleral icterus.

No conjunctival pallor. Normocephalic, atraumatic. No pharyngeal erythema. No 

thyromegaly.  


CARDIOVASCULAR: S1 and S2  muffled 


PULMONARY: diminished breath sounds bilaterally otherwise clear to auscultation 

with no wheezing or rhonchi noted. 


ABDOMEN: soft.  Nontender on exam.  obese. non-distended, normoactive bowel 

sounds. No palpable organomegaly. 


MUSCULOSKELETAL: No joint swelling or deformity.


EXTREMITIES: No cyanosis, clubbing, or pedal edema.  Left hip surgical incision 

site is clean dry and intact with no significant erythema noted.  Minimal 

swelling noted and soft and palpable


NEUROLOGICAL: Gross neurological examination did not reveal any focal deficits. 

Diffuse weakness


SKIN: No rashes. 





Assessment:





Status post left total hip arthroplasty


COPD, not in exacerbation


Leukocytosis, mild, likely reactive given patient's recent surgery, no active 

signs of infection noted


Hyperlipidemia


Hypertension


Previous myocardial infarction with stenting


History of anxiety and depression


History of coronary artery disease


Former smoker


Obesity with a BMI of 30.8


GI prophylaxis


DVT prophylaxis


Full code





Plan:





Recommend to continue with current medications and management per orthopedic 

services.  Pain management and DVT prophylaxis per orthopedics.  Patient is 

continued on her aspirin and Plavix


Home medications reviewed and resumed and recommend resuming blood pressure 

medications once getting home.  Patient's blood pressure medications were held 

to monitor for postoperative hypotension.


patient reports she has history of COPD and was scheduled to receive outpatient 

O2 at 2-3 L at night as patient has been having low pulse ox readings and 

underwent sleep study reporting she does not have sleep apnea.  Awaiting heart 

medical to approve her oxygen.  Patient maintaining oxygen saturations above 92%

while at the hospital.


Encouraged continued use of Incentive spirometer at least 10 times every hour 

while awake.  Instructed patient to take the incentive spirometer home


Patient was reevaluated by physical therapy and did well tolerated the stairs 

and will be going home with family


Patient is medically stable for discharge once cleared by orthopedics


We will continue to follow with orthopedics during hospitalization.  Thank you 

kindly for this consultation








The impression and plan of care has been dictated by Shonna Queen, nurse 

practitioner as directed.





Dr. Virginia MD


I have performed a history and examination and MDM of this patient, discussed 

the same with the dictator, and  agree with the dictator's assessment and plan 

as written ,documented as a scribe. Based on total visit time,  I have performed

more than 50% of the visit. Any additional findings or plans will be noted. 








Objective





- Vital Signs


Vital signs: 


                                   Vital Signs











Temp  98.6 F   12/14/23 07:44


 


Pulse  77   12/14/23 08:32


 


Resp  17   12/14/23 08:32


 


BP  145/66   12/14/23 07:44


 


Pulse Ox  94 L  12/14/23 07:44


 


FiO2      








                                 Intake & Output











 12/13/23 12/14/23 12/14/23





 18:59 06:59 18:59


 


Intake Total  650 


 


Balance  650 


 


Intake:   


 


  Oral  650 


 


Other:   


 


  Voiding Method Toilet  Toilet


 


  # Voids 5 2 


 


  # Bowel Movements 1  














- Labs


CBC & Chem 7: 


                                 12/13/23 06:36

## 2024-01-01 NOTE — XR
EXAM:

  XR Chest, 2 Views

 

CLINICAL HISTORY:

  ITS.REASON XR Reason: dysrhythmia

 

TECHNIQUE:

  Frontal and lateral views of the chest.

 

COMPARISON:

  10/30/2018.

 

FINDINGS:

  Lungs:  A 0.8 cm calcified granuloma at the right lung base is noted, 

unchanged.  No consolidative changes.

  Pleural space:  No pleural effusions.  No pneumothorax.

  Heart:  Mild cardiomegaly.

  Mediastinum:  Unremarkable.

  Bones/joints:  Osteopenia.  Moderate degenerative disc disease of the 

thoracic spine.

 

IMPRESSION:     

1.  Cardiomegaly.

2.  Osteopenia.

3.  Calcified granuloma at the periphery of the right lung base, stable. Glucose screening protocol

## 2024-01-06 ENCOUNTER — HOSPITAL ENCOUNTER (EMERGENCY)
Dept: HOSPITAL 47 - EC | Age: 79
LOS: 1 days | Discharge: HOME | End: 2024-01-07
Payer: MEDICARE

## 2024-01-06 DIAGNOSIS — Z79.899: ICD-10-CM

## 2024-01-06 DIAGNOSIS — I10: ICD-10-CM

## 2024-01-06 DIAGNOSIS — Z88.8: ICD-10-CM

## 2024-01-06 DIAGNOSIS — N39.0: Primary | ICD-10-CM

## 2024-01-06 DIAGNOSIS — Z79.82: ICD-10-CM

## 2024-01-06 DIAGNOSIS — F41.9: ICD-10-CM

## 2024-01-06 DIAGNOSIS — F32.A: ICD-10-CM

## 2024-01-06 DIAGNOSIS — Z20.822: ICD-10-CM

## 2024-01-06 DIAGNOSIS — I25.10: ICD-10-CM

## 2024-01-06 DIAGNOSIS — R53.1: ICD-10-CM

## 2024-01-06 DIAGNOSIS — J44.9: ICD-10-CM

## 2024-01-06 LAB
ALBUMIN SERPL-MCNC: 4.7 G/DL (ref 3.5–5)
ALP SERPL-CCNC: 147 U/L (ref 38–126)
ALT SERPL-CCNC: 16 U/L (ref 4–34)
ANION GAP SERPL CALC-SCNC: 14 MMOL/L
APTT BLD: 19.1 SEC (ref 22–30)
AST SERPL-CCNC: 30 U/L (ref 14–36)
BASOPHILS # BLD AUTO: 0 K/UL (ref 0–0.2)
BASOPHILS NFR BLD AUTO: 0 %
BUN SERPL-SCNC: 24 MG/DL (ref 7–17)
CALCIUM SPEC-MCNC: 11 MG/DL (ref 8.4–10.2)
CHLORIDE SERPL-SCNC: 99 MMOL/L (ref 98–107)
CO2 SERPL-SCNC: 27 MMOL/L (ref 22–30)
EOSINOPHIL # BLD AUTO: 0.1 K/UL (ref 0–0.7)
EOSINOPHIL NFR BLD AUTO: 1 %
ERYTHROCYTE [DISTWIDTH] IN BLOOD BY AUTOMATED COUNT: 4.36 M/UL (ref 3.8–5.4)
ERYTHROCYTE [DISTWIDTH] IN BLOOD: 14.1 % (ref 11.5–15.5)
GLUCOSE SERPL-MCNC: 107 MG/DL (ref 74–99)
HCT VFR BLD AUTO: 37.5 % (ref 34–46)
HGB BLD-MCNC: 12 GM/DL (ref 11.4–16)
HYALINE CASTS UR QL AUTO: 24 /LPF (ref 0–2)
INR PPP: 0.9 (ref ?–1.2)
LYMPHOCYTES # SPEC AUTO: 2 K/UL (ref 1–4.8)
LYMPHOCYTES NFR SPEC AUTO: 22 %
MAGNESIUM SPEC-SCNC: 1.9 MG/DL (ref 1.6–2.3)
MAGNESIUM SPEC-SCNC: 1.9 MG/DL (ref 1.6–2.3)
MCH RBC QN AUTO: 27.4 PG (ref 25–35)
MCHC RBC AUTO-ENTMCNC: 31.8 G/DL (ref 31–37)
MCV RBC AUTO: 86.2 FL (ref 80–100)
MONOCYTES # BLD AUTO: 0.4 K/UL (ref 0–1)
MONOCYTES NFR BLD AUTO: 4 %
NEUTROPHILS # BLD AUTO: 6.6 K/UL (ref 1.3–7.7)
NEUTROPHILS NFR BLD AUTO: 71 %
PH UR: 5.5 [PH] (ref 5–8)
PLATELET # BLD AUTO: 363 K/UL (ref 150–450)
POTASSIUM SERPL-SCNC: 3.8 MMOL/L (ref 3.5–5.1)
PROT SERPL-MCNC: 7.5 G/DL (ref 6.3–8.2)
PT BLD: 10.1 SEC (ref 10–12.5)
RBC UR QL: 1 /HPF (ref 0–5)
SODIUM SERPL-SCNC: 140 MMOL/L (ref 137–145)
SP GR UR: 1.02 (ref 1–1.03)
SQUAMOUS UR QL AUTO: 2 /HPF (ref 0–4)
UROBILINOGEN UR QL STRIP: <2 MG/DL (ref ?–2)
WBC # BLD AUTO: 9.3 K/UL (ref 3.8–10.6)
WBC # UR AUTO: 73 /HPF (ref 0–5)

## 2024-01-06 PROCEDURE — 87086 URINE CULTURE/COLONY COUNT: CPT

## 2024-01-06 PROCEDURE — 83735 ASSAY OF MAGNESIUM: CPT

## 2024-01-06 PROCEDURE — 85025 COMPLETE CBC W/AUTO DIFF WBC: CPT

## 2024-01-06 PROCEDURE — 81001 URINALYSIS AUTO W/SCOPE: CPT

## 2024-01-06 PROCEDURE — 96374 THER/PROPH/DIAG INJ IV PUSH: CPT

## 2024-01-06 PROCEDURE — 85610 PROTHROMBIN TIME: CPT

## 2024-01-06 PROCEDURE — 84484 ASSAY OF TROPONIN QUANT: CPT

## 2024-01-06 PROCEDURE — 71046 X-RAY EXAM CHEST 2 VIEWS: CPT

## 2024-01-06 PROCEDURE — 36415 COLL VENOUS BLD VENIPUNCTURE: CPT

## 2024-01-06 PROCEDURE — 84443 ASSAY THYROID STIM HORMONE: CPT

## 2024-01-06 PROCEDURE — 80053 COMPREHEN METABOLIC PANEL: CPT

## 2024-01-06 PROCEDURE — 99285 EMERGENCY DEPT VISIT HI MDM: CPT

## 2024-01-06 PROCEDURE — 85730 THROMBOPLASTIN TIME PARTIAL: CPT

## 2024-01-06 PROCEDURE — 83605 ASSAY OF LACTIC ACID: CPT

## 2024-01-06 PROCEDURE — 96361 HYDRATE IV INFUSION ADD-ON: CPT

## 2024-01-06 PROCEDURE — 87636 SARSCOV2 & INF A&B AMP PRB: CPT

## 2024-01-06 NOTE — ED
Weakness HPI





- General


Source: patient


Mode of arrival: ambulatory


Limitations: no limitations





<Chico Cerda - Last Filed: 24 17:42>





<Vinod Swenson - Last Filed: 24 00:18>





- General


Chief complaint: Weakness


Stated complaint: Weakness


Time Seen by Provider: 24 17:42





- History of Present Illness


Initial comments: 


78-year-old female presenting with chief complaint of weakness.  Symptoms have 

been worsening over the last 2 weeks.  She states that she is constantly tired 

and feels weak walking even short distances.  She also has a decreased appetite.

 She had a recent hip replacement on , she states that it seems to be

healing well and is not causing pain nor is it have any redness or swelling.


 (Chico Cerda)





- Related Data


                                Home Medications











 Medication  Instructions  Recorded  Confirmed


 


amLODIPine BESYLATE [Norvasc] 5 mg PO BID 16


 


Omeprazole 20 mg PO QAM 17


 


Atorvastatin [Lipitor] 40 mg PO HS 21


 


Ergocalciferol [Vitamin D2 (1250 1,250 mcg PO FR 21





Mcg = 70064 Iu)]   


 


Aspirin EC [Ecotrin Low Dose] 81 mg PO DAILY 10/24/21 12/07/23


 


Thiamine [Vitamin B-1] 100 mg PO DAILY 10/24/21 12/12/23


 


OLANZapine 5 mg PO HS 23


 


hydrALAZINE HCL [Apresoline] 25 mg PO TID 23


 


hydroCHLOROthiazide [Hydrodiuril] 25 mg PO QAM 23


 


clonazePAM [KlonoPIN] 0.5 mg PO QAM 23








                                  Previous Rx's











 Medication  Instructions  Recorded


 


Clopidogrel [Plavix] 75 mg PO DAILY #30 tab 17


 


HYDROcodone/APAP 7.5-325MG [Norco 1 - 2 tab PO Q6HR PRN #32 tab 23





7.5-325]  


 


Sennosides/Docusate Sodium [Senna 1 each PO DAILY #20 capsule 23





Plus 8.6-50 mg Softgel]  


 


Cephalexin [Keflex] 500 mg PO Q6HR #21 cap 24











                                    Allergies











Allergy/AdvReac Type Severity Reaction Status Date / Time


 


zolpidem tartrate AdvReac Severe Hallucinati Verified 24 17:08





[From Ambien]   ons  














Review of Systems


ROS Other: All systems not noted in ROS Statement are negative.





<Chico Cerda - Last Filed: 24 17:42>


ROS Other: All systems not noted in ROS Statement are negative.





<Vinod Swenson - Last Filed: 24 00:18>


ROS Statement: 


Those systems with pertinent positive or pertinent negative responses have been 

documented in the HPI.








Past Medical History


Past Medical History: Coronary Artery Disease (CAD), Chest Pain / Angina, COPD, 

Hypertension, Syncope, Thyroid Disorder


Additional Past Medical History / Comment(s): parathyroid nodules, high calcium


Last Myocardial Infarction Date:: 2015


History of Any Multi-Drug Resistant Organisms: None Reported


Past Surgical History: Heart Catheterization With Stent


Additional Past Surgical History / Comment(s): cyst removed from back, heart 

cath with a stent placed 2015. parathyroid removal


Past Anesthesia/Blood Transfusion Reactions: No Reported Reaction


Date of Last Stent Placement:: 2015


Past Psychological History: Anxiety, Depression


Smoking Status: Never smoker


Past Alcohol Use History: Occasional, Rare


Past Drug Use History: None Reported





- Past Family History


  ** Father


Family Medical History: No Reported History


Additional Family Medical History / Comment(s): FATHER  AT AGE 70. PT UNSURE

WHAT EXACTLY HE PASSED AWAY FROM. Had arthritis and both legs amputated.





  ** Mother


Family Medical History: Congestive Heart Failure (CHF)


Additional Family Medical History / Comment(s): MOTHER  AT AGE 91 YRS.





  ** Son(s)


Family Medical History: Cancer


Additional Family Medical History / Comment(s): Brain tumor, .





  ** Daughter(s)


Family Medical History: Deep Vein Thrombosis (DVT)





<Chico Cerda - Last Filed: 24 17:42>





General Exam


Limitations: no limitations





<Chico Cerda - Last Filed: 24 17:42>





- General Exam Comments


Initial Comments: 


Visual Physical Exam





Vital signs reviewed





General: Well-appearing, nontoxic, no acute distress.


Head: Normocephalic, atraumatic


Eyes: PERRLA, EOMI


ENT: Airway patent


Chest: Nonlabored breathing


Skin: No visual rash, normal skin tone


Neuro: Alert and oriented 3


Musculoskeletal: No gross abnormalities


 (Chico Cerda)





Course





<Vinod Swenson - Last Filed: 24 00:18>


                                   Vital Signs











  24





  17:06 22:09


 


Temperature 98.8 F 


 


Pulse Rate 72 63


 


Respiratory 20 16





Rate  


 


Blood Pressure 137/66 143/80


 


O2 Sat by Pulse 96 98





Oximetry  














- Reevaluation(s)


Reevaluation #4: 





24 22:12


Was pt. sent in by a medical professional or institution (TIANNA Wolff, NP, urgent 

care, hospital, or nursing home...) When possible be specific


@  -no


Did you speak to anyone other than the patient for history (EMS, parent, family,

police, friend...)? What history was obtained from this source 


@  -no


Did you review nursing and triage notes (agree or disagree)?  Why? 


@  -agree


Are old charts reviewed (outside hosp., previous admission, EMS record, old EKG,

old radiological studies, urgent care reports/EKG's, nursing home records)? 

Report findings 


@  -yes


Differential Diagnosis (chest pain, altered mental status, abdominal pain women,

abdominal pain men, vaginal bleeding, weakness, fever, dyspnea, syncope, 

headache, dizziness, GI bleed, back pain, seizure, CVA, palpatations, mental 

health, musculoskeletal)? 


@  -prior


EKG interpreted by me (3pts min.).


@  -yes


X-rays interpreted by me (1pt min.).


@  -yes


CT interpreted by me (1pt min.).


@  -no


U/S interpreted by me (1pt. min.).


@  -no


What testing was considered but not performed or refused? (CT, X-rays, U/S, 

labs)? Why?


@  -none


What meds were considered but not given or refused? Why?


@  -none


Did you discuss the management of the patient with other professionals 

(professionals i.e. TIANNA Wolff, NP, lab, RT, psych nurse, , , 

teacher, , )? Give summary


@  -no


Was smoking cessation discussed for >3mins.?


@  -no


Was critical care preformed (if so, how long)?


@  -no


Were there social determinants of health that impacted care today? How? 

(Homelessness, low income, unemployed, alcoholism, drug addiction, 

transportation, low edu. Level, literacy, decrease access to med. care, alf, 

rehab)?


@  -none


Was there de-escalation of care discussed even if they declined (Discuss DNR or 

withdrawal of care, Hospice)? DNR status


@  -no


What co-morbidities impacted this encounter? (DM, HTN, Smoking, COPD, CAD, 

Cancer, CVA, ARF, Chemo, Hep., AIDS, mental health diagnosis, sleep apnea, 

morbid obesity)?


@  -none


Was patient admitted / discharged? Hospital course, mention meds given and 

route, prescriptions, significant lab abnormalities, going to OR and other 

pertinent info.


@  - 


Undiagnosed new problem with uncertain prognosis?


@  -no


Drug Therapy requiring intensive monitoring for toxicity (Heparin, Nitro, 

Insulin, Cardizem)?


@  -no


Were any procedures done?


@  -no


Diagnosis/symptom?


@  -


Acute, or Chronic, or Acute on Chronic?


@  -Acute


Uncomplicated (without systemic symptoms) or Complicated (systemic symptoms)?


@  -Complicated


Side effects of treatment?


@  -no


Exacerbation, Progression, or Severe Exacerbation?


@  -exacerbation


Poses a threat to life or bodily function? How? (Chest pain, USA, MI, pneumonia,

PE, COPD, DKA, ARF, appy, cholecystitis, CVA, Diverticulitis, Homicidal, 

Suicidal, threat to staff... and all critical care pts)


@  -yes  (Vinod Swenson)





Medical Decision Making





- Lab Data


Result diagrams: 


                                 24 18:00





                                 24 18:00





<Vinod Swenson - Last Filed: 24 00:18>





- Lab Data


                                   Lab Results











  24 Range/Units





  17:35 18:00 18:00 


 


WBC   9.3   (3.8-10.6)  k/uL


 


RBC   4.36   (3.80-5.40)  m/uL


 


Hgb   12.0   (11.4-16.0)  gm/dL


 


Hct   37.5   (34.0-46.0)  %


 


MCV   86.2   (80.0-100.0)  fL


 


MCH   27.4   (25.0-35.0)  pg


 


MCHC   31.8   (31.0-37.0)  g/dL


 


RDW   14.1   (11.5-15.5)  %


 


Plt Count   363   (150-450)  k/uL


 


MPV   8.5   


 


Neutrophils %   71   %


 


Lymphocytes %   22   %


 


Monocytes %   4   %


 


Eosinophils %   1   %


 


Basophils %   0   %


 


Neutrophils #   6.6   (1.3-7.7)  k/uL


 


Lymphocytes #   2.0   (1.0-4.8)  k/uL


 


Monocytes #   0.4   (0-1.0)  k/uL


 


Eosinophils #   0.1   (0-0.7)  k/uL


 


Basophils #   0.0   (0-0.2)  k/uL


 


Hypochromasia   Moderate   


 


Poikilocytosis   Slight   


 


PT    10.1  (10.0-12.5)  sec


 


INR    0.9  (<1.2)  


 


APTT    19.1 L  (22.0-30.0)  sec


 


Sodium     (137-145)  mmol/L


 


Potassium     (3.5-5.1)  mmol/L


 


Chloride     ()  mmol/L


 


Carbon Dioxide     (22-30)  mmol/L


 


Anion Gap     mmol/L


 


BUN     (7-17)  mg/dL


 


Creatinine     (0.52-1.04)  mg/dL


 


Est GFR (CKD-EPI)AfAm     (>60 ml/min/1.73 sqM)  


 


Est GFR (CKD-EPI)NonAf     (>60 ml/min/1.73 sqM)  


 


Glucose     (74-99)  mg/dL


 


Plasma Lactic Acid Joe     (0.7-2.0)  mmol/L


 


Calcium     (8.4-10.2)  mg/dL


 


Magnesium     (1.6-2.3)  mg/dL


 


Total Bilirubin     (0.2-1.3)  mg/dL


 


AST     (14-36)  U/L


 


ALT     (4-34)  U/L


 


Alkaline Phosphatase     ()  U/L


 


Troponin I     (0.000-0.034)  ng/mL


 


Total Protein     (6.3-8.2)  g/dL


 


Albumin     (3.5-5.0)  g/dL


 


TSH     (0.465-4.680)  mIU/L


 


Urine Color     


 


Urine Appearance     (Clear)  


 


Urine pH     (5.0-8.0)  


 


Ur Specific Gravity     (1.001-1.035)  


 


Urine Protein     (Negative)  


 


Urine Glucose (UA)     (Negative)  


 


Urine Ketones     (Negative)  


 


Urine Blood     (Negative)  


 


Urine Nitrite     (Negative)  


 


Urine Bilirubin     (Negative)  


 


Urine Urobilinogen     (<2.0)  mg/dL


 


Ur Leukocyte Esterase     (Negative)  


 


Urine RBC     (0-5)  /hpf


 


Urine WBC     (0-5)  /hpf


 


Ur Squamous Epith Cells     (0-4)  /hpf


 


Urine Bacteria     (None)  /hpf


 


Hyaline Casts     (0-2)  /lpf


 


Urine Mucus     (None)  /hpf


 


Influenza Type A (PCR)  Not Detected    (Not Detectd)  


 


Influenza Type B (PCR)  Not Detected    (Not Detectd)  


 


RSV (PCR)  Not Detected    (Not Detectd)  


 


SARS-CoV-2 (PCR)  Not Detected    (Not Detectd)  














  24 Range/Units





  18:00 18:00 22:05 


 


WBC     (3.8-10.6)  k/uL


 


RBC     (3.80-5.40)  m/uL


 


Hgb     (11.4-16.0)  gm/dL


 


Hct     (34.0-46.0)  %


 


MCV     (80.0-100.0)  fL


 


MCH     (25.0-35.0)  pg


 


MCHC     (31.0-37.0)  g/dL


 


RDW     (11.5-15.5)  %


 


Plt Count     (150-450)  k/uL


 


MPV     


 


Neutrophils %     %


 


Lymphocytes %     %


 


Monocytes %     %


 


Eosinophils %     %


 


Basophils %     %


 


Neutrophils #     (1.3-7.7)  k/uL


 


Lymphocytes #     (1.0-4.8)  k/uL


 


Monocytes #     (0-1.0)  k/uL


 


Eosinophils #     (0-0.7)  k/uL


 


Basophils #     (0-0.2)  k/uL


 


Hypochromasia     


 


Poikilocytosis     


 


PT     (10.0-12.5)  sec


 


INR     (<1.2)  


 


APTT     (22.0-30.0)  sec


 


Sodium  140    (137-145)  mmol/L


 


Potassium  3.8    (3.5-5.1)  mmol/L


 


Chloride  99    ()  mmol/L


 


Carbon Dioxide  27    (22-30)  mmol/L


 


Anion Gap  14    mmol/L


 


BUN  24 H    (7-17)  mg/dL


 


Creatinine  0.74    (0.52-1.04)  mg/dL


 


Est GFR (CKD-EPI)AfAm  90    (>60 ml/min/1.73 sqM)  


 


Est GFR (CKD-EPI)NonAf  78    (>60 ml/min/1.73 sqM)  


 


Glucose  107 H    (74-99)  mg/dL


 


Plasma Lactic Acid Joe    1.1  (0.7-2.0)  mmol/L


 


Calcium  11.0 H    (8.4-10.2)  mg/dL


 


Magnesium  1.9    (1.6-2.3)  mg/dL


 


Total Bilirubin  0.6    (0.2-1.3)  mg/dL


 


AST  30    (14-36)  U/L


 


ALT  16    (4-34)  U/L


 


Alkaline Phosphatase  147 H    ()  U/L


 


Troponin I   <0.012   (0.000-0.034)  ng/mL


 


Total Protein  7.5    (6.3-8.2)  g/dL


 


Albumin  4.7    (3.5-5.0)  g/dL


 


TSH     (0.465-4.680)  mIU/L


 


Urine Color     


 


Urine Appearance     (Clear)  


 


Urine pH     (5.0-8.0)  


 


Ur Specific Gravity     (1.001-1.035)  


 


Urine Protein     (Negative)  


 


Urine Glucose (UA)     (Negative)  


 


Urine Ketones     (Negative)  


 


Urine Blood     (Negative)  


 


Urine Nitrite     (Negative)  


 


Urine Bilirubin     (Negative)  


 


Urine Urobilinogen     (<2.0)  mg/dL


 


Ur Leukocyte Esterase     (Negative)  


 


Urine RBC     (0-5)  /hpf


 


Urine WBC     (0-5)  /hpf


 


Ur Squamous Epith Cells     (0-4)  /hpf


 


Urine Bacteria     (None)  /hpf


 


Hyaline Casts     (0-2)  /lpf


 


Urine Mucus     (None)  /hpf


 


Influenza Type A (PCR)     (Not Detectd)  


 


Influenza Type B (PCR)     (Not Detectd)  


 


RSV (PCR)     (Not Detectd)  


 


SARS-CoV-2 (PCR)     (Not Detectd)  














  24 Range/Units





  22:05 23:00 


 


WBC    (3.8-10.6)  k/uL


 


RBC    (3.80-5.40)  m/uL


 


Hgb    (11.4-16.0)  gm/dL


 


Hct    (34.0-46.0)  %


 


MCV    (80.0-100.0)  fL


 


MCH    (25.0-35.0)  pg


 


MCHC    (31.0-37.0)  g/dL


 


RDW    (11.5-15.5)  %


 


Plt Count    (150-450)  k/uL


 


MPV    


 


Neutrophils %    %


 


Lymphocytes %    %


 


Monocytes %    %


 


Eosinophils %    %


 


Basophils %    %


 


Neutrophils #    (1.3-7.7)  k/uL


 


Lymphocytes #    (1.0-4.8)  k/uL


 


Monocytes #    (0-1.0)  k/uL


 


Eosinophils #    (0-0.7)  k/uL


 


Basophils #    (0-0.2)  k/uL


 


Hypochromasia    


 


Poikilocytosis    


 


PT    (10.0-12.5)  sec


 


INR    (<1.2)  


 


APTT    (22.0-30.0)  sec


 


Sodium    (137-145)  mmol/L


 


Potassium    (3.5-5.1)  mmol/L


 


Chloride    ()  mmol/L


 


Carbon Dioxide    (22-30)  mmol/L


 


Anion Gap    mmol/L


 


BUN    (7-17)  mg/dL


 


Creatinine    (0.52-1.04)  mg/dL


 


Est GFR (CKD-EPI)AfAm    (>60 ml/min/1.73 sqM)  


 


Est GFR (CKD-EPI)NonAf    (>60 ml/min/1.73 sqM)  


 


Glucose    (74-99)  mg/dL


 


Plasma Lactic Acid Joe    (0.7-2.0)  mmol/L


 


Calcium    (8.4-10.2)  mg/dL


 


Magnesium  1.9   (1.6-2.3)  mg/dL


 


Total Bilirubin    (0.2-1.3)  mg/dL


 


AST    (14-36)  U/L


 


ALT    (4-34)  U/L


 


Alkaline Phosphatase    ()  U/L


 


Troponin I    (0.000-0.034)  ng/mL


 


Total Protein    (6.3-8.2)  g/dL


 


Albumin    (3.5-5.0)  g/dL


 


TSH  2.340   (0.465-4.680)  mIU/L


 


Urine Color   Yellow  


 


Urine Appearance   Clear  (Clear)  


 


Urine pH   5.5  (5.0-8.0)  


 


Ur Specific Gravity   1.025  (1.001-1.035)  


 


Urine Protein   Negative  (Negative)  


 


Urine Glucose (UA)   Negative  (Negative)  


 


Urine Ketones   Negative  (Negative)  


 


Urine Blood   Negative  (Negative)  


 


Urine Nitrite   Negative  (Negative)  


 


Urine Bilirubin   Negative  (Negative)  


 


Urine Urobilinogen   <2.0  (<2.0)  mg/dL


 


Ur Leukocyte Esterase   Large H  (Negative)  


 


Urine RBC   1  (0-5)  /hpf


 


Urine WBC   73 H  (0-5)  /hpf


 


Ur Squamous Epith Cells   2  (0-4)  /hpf


 


Urine Bacteria   Rare H  (None)  /hpf


 


Hyaline Casts   24 H  (0-2)  /lpf


 


Urine Mucus   Few H  (None)  /hpf


 


Influenza Type A (PCR)    (Not Detectd)  


 


Influenza Type B (PCR)    (Not Detectd)  


 


RSV (PCR)    (Not Detectd)  


 


SARS-CoV-2 (PCR)    (Not Detectd)  














Disposition





<Chico Cerda - Last Filed: 24 17:42>


Is patient prescribed a controlled substance at d/c from ED?: No


Time of Disposition: 23:55





<Vinod Swenson - Last Filed: 24 00:18>


Clinical Impression: 


 UTI (urinary tract infection), Weakness





Disposition: HOME SELF-CARE


Condition: Good


Instructions (If sedation given, give patient instructions):  Weakness (ED)


Prescriptions: 


Cephalexin [Keflex] 500 mg PO Q6HR #21 cap


Referrals: 


Sylwia Slaughter MD [Primary Care Provider] - 1-2 days

## 2024-01-06 NOTE — XR
EXAMINATION TYPE: XR chest 2V

 

DATE OF EXAM: 1/6/2024

 

COMPARISON: 10/24/2021

 

HISTORY: 78-year-old female with cough

 

TECHNIQUE:  PA and lateral views

 

FINDINGS:  

The cardiomediastinal silhouette, aorta, and pulmonary vasculature are within normal limits. Hyperinf
lation. Large appearance to the right main pulmonary artery on the lateral view. Calcified granuloma 
suggested at the right base. Otherwise, no consolidation or pleural effusion.

 

IMPRESSION:  

Correlate for COPD with pulmonary arterial hypertension. No acute process seen.

## 2024-01-07 VITALS
RESPIRATION RATE: 18 BRPM | HEART RATE: 86 BPM | DIASTOLIC BLOOD PRESSURE: 74 MMHG | SYSTOLIC BLOOD PRESSURE: 140 MMHG | TEMPERATURE: 98.5 F

## 2024-02-26 ENCOUNTER — HOSPITAL ENCOUNTER (OUTPATIENT)
Dept: HOSPITAL 47 - RADUSWWP | Age: 79
Discharge: HOME | End: 2024-02-26
Attending: FAMILY MEDICINE
Payer: MEDICARE

## 2024-02-26 DIAGNOSIS — N63.20: Primary | ICD-10-CM

## 2024-02-26 DIAGNOSIS — Z78.0: ICD-10-CM

## 2024-02-26 NOTE — USB
Reason for Exam: Follow-up at short interval from prior study. 





Patient History: 

Menarche at age 8. First Full-Term Pregnancy at age 21. Postmenopausal. 





Risk Values: 

Rubi 5 year model risk: 1.7%.

NCI Lifetime model risk: 2.8%.

Technique: 

Method: Targeted.  





Prior Study Comparison: 

7/15/2016 Bilateral Screening Mammogram, [facility]. 3/22/2018 Bilateral Screening Mammogram, [facility].

7/14/2023 Bilateral MG 3D diag mammo w/cad JOSE, [facility]. 





Findings: 

The upper inner quadrant of the left breast, the axilla of the left breast and the retroareolar of

the left breast were scanned.

No solid or cystic masses are identified.. Previous hyperechoic area identified on previous

ultrasound is not evident on the current examination. 





Overall Assessment: Negative, BI-RAD 1





Management: 

Screening Mammogram of both breasts in 5 months.

A clinical breast exam by your physician is recommended on an annual basis and results should be

correlated with mammographic findings.  This exam should not preclude additional follow-up of

suspicious palpable abnormalities. Results were given to the patient verbally at the time of exam.



Electronically signed and approved by: Gen Hinds D.O. Radiologis

## 2024-04-14 ENCOUNTER — HOSPITAL ENCOUNTER (EMERGENCY)
Dept: HOSPITAL 47 - EC | Age: 79
Discharge: HOME | End: 2024-04-14
Payer: MEDICARE

## 2024-04-14 VITALS — SYSTOLIC BLOOD PRESSURE: 168 MMHG | DIASTOLIC BLOOD PRESSURE: 73 MMHG | HEART RATE: 50 BPM

## 2024-04-14 VITALS — RESPIRATION RATE: 18 BRPM | TEMPERATURE: 98 F

## 2024-04-14 DIAGNOSIS — G45.9: Primary | ICD-10-CM

## 2024-04-14 DIAGNOSIS — Z88.8: ICD-10-CM

## 2024-04-14 LAB
ALBUMIN SERPL-MCNC: 4.2 G/DL (ref 3.5–5)
ALP SERPL-CCNC: 134 U/L (ref 38–126)
ALT SERPL-CCNC: 15 U/L (ref 4–34)
ANION GAP SERPL CALC-SCNC: 7 MMOL/L
AST SERPL-CCNC: 21 U/L (ref 14–36)
BUN SERPL-SCNC: 20 MG/DL (ref 7–17)
CALCIUM SPEC-MCNC: 10.3 MG/DL (ref 8.4–10.2)
CHLORIDE SERPL-SCNC: 104 MMOL/L (ref 98–107)
CO2 SERPL-SCNC: 28 MMOL/L (ref 22–30)
ERYTHROCYTE [DISTWIDTH] IN BLOOD BY AUTOMATED COUNT: 4.65 M/UL (ref 3.8–5.4)
ERYTHROCYTE [DISTWIDTH] IN BLOOD: 15 % (ref 11.5–15.5)
GLUCOSE SERPL-MCNC: 94 MG/DL (ref 74–99)
HCT VFR BLD AUTO: 37.9 % (ref 34–46)
HGB BLD-MCNC: 11.8 GM/DL (ref 11.4–16)
MCH RBC QN AUTO: 25.3 PG (ref 25–35)
MCHC RBC AUTO-ENTMCNC: 31 G/DL (ref 31–37)
MCV RBC AUTO: 81.6 FL (ref 80–100)
PLATELET # BLD AUTO: 268 K/UL (ref 150–450)
POTASSIUM SERPL-SCNC: 4 MMOL/L (ref 3.5–5.1)
PROT SERPL-MCNC: 6.8 G/DL (ref 6.3–8.2)
SODIUM SERPL-SCNC: 139 MMOL/L (ref 137–145)
WBC # BLD AUTO: 8.5 K/UL (ref 3.8–10.6)

## 2024-04-14 PROCEDURE — 70450 CT HEAD/BRAIN W/O DYE: CPT

## 2024-04-14 PROCEDURE — 99285 EMERGENCY DEPT VISIT HI MDM: CPT

## 2024-04-14 PROCEDURE — 85027 COMPLETE CBC AUTOMATED: CPT

## 2024-04-14 PROCEDURE — 36415 COLL VENOUS BLD VENIPUNCTURE: CPT

## 2024-04-14 PROCEDURE — 93005 ELECTROCARDIOGRAM TRACING: CPT

## 2024-04-14 PROCEDURE — 80053 COMPREHEN METABOLIC PANEL: CPT

## 2024-04-14 NOTE — ED
General Adult HPI





- General


Source: patient, family, RN notes reviewed


Mode of arrival: ambulatory


Limitations: no limitations





<Ernestina De La O - Last Filed: 24 15:04>





<Balta Valentin - Last Filed: 24 17:25>





- General


Stated complaint: Slurred Speech


Time Seen by Provider: 24 15:04





- History of Present Illness


Initial comments: 





Quick note: 78-year-old female presented to the ER with a chief complaint of 

slurring words.  She states for the past couple of days she has been slurring 

her words.  Denies any weakness or facial droop. (Ernestina De La O)


78-year-old female with intermittent slurred speech for the past 3 days.  

Patient has previous history of TIA.  She is currently on aspirin, Plavix, 

Lipitor.  She denies headache.  Denies chest pain.  Denies limb numbness or 

weakness.  States her symptom is currently resolved at this time. 

(Balta Valentin)





- Related Data


                                Home Medications











 Medication  Instructions  Recorded  Confirmed


 


amLODIPine BESYLATE [Norvasc] 5 mg PO BID 16


 


Omeprazole 20 mg PO QAM 17


 


Atorvastatin [Lipitor] 40 mg PO HS 21


 


Ergocalciferol [Vitamin D2 (1250 1,250 mcg PO FR 21





Mcg = 29919 Iu)]   


 


Aspirin EC [Ecotrin Low Dose] 81 mg PO DAILY 10/24/21 12/07/23


 


Thiamine [Vitamin B-1] 100 mg PO DAILY 10/24/21 12/12/23


 


OLANZapine 5 mg PO HS 23


 


hydrALAZINE HCL [Apresoline] 25 mg PO TID 23


 


hydroCHLOROthiazide [Hydrodiuril] 25 mg PO QAM 23


 


clonazePAM [KlonoPIN] 0.5 mg PO QAM 23








                                  Previous Rx's











 Medication  Instructions  Recorded


 


Clopidogrel [Plavix] 75 mg PO DAILY #30 tab 17


 


HYDROcodone/APAP 7.5-325MG [Norco 1 - 2 tab PO Q6HR PRN #32 tab 23





7.5-325]  


 


Sennosides/Docusate Sodium [Senna 1 each PO DAILY #20 capsule 23





Plus 8.6-50 mg Softgel]  


 


Cephalexin [Keflex] 500 mg PO Q6HR #21 cap 24











                                    Allergies











Allergy/AdvReac Type Severity Reaction Status Date / Time


 


zolpidem tartrate AdvReac Severe Hallucinati Verified 24 15:16





[From Ambien]   ons  














Review of Systems


ROS Other: All systems not noted in ROS Statement are negative.





<Ernestina De La O - Last Filed: 24 15:04>


ROS Other: All systems not noted in ROS Statement are negative.





<Balta Valentin - Last Filed: 24 17:25>


ROS Statement: 


Those systems with pertinent positive or pertinent negative responses have been 

documented in the HPI.








Past Medical History


Past Medical History: Coronary Artery Disease (CAD), Chest Pain / Angina, COPD, 

Hypertension, Syncope, Thyroid Disorder


Additional Past Medical History / Comment(s): parathyroid nodules, high calcium


Last Myocardial Infarction Date:: 2015


History of Any Multi-Drug Resistant Organisms: None Reported


Past Surgical History: Heart Catheterization With Stent


Additional Past Surgical History / Comment(s): cyst removed from back, heart 

cath with a stent placed 2015. parathyroid removal


Past Anesthesia/Blood Transfusion Reactions: No Reported Reaction


Date of Last Stent Placement:: 2015


Past Psychological History: Anxiety, Depression


Smoking Status: Never smoker


Past Alcohol Use History: Occasional, Rare


Past Drug Use History: None Reported





- Past Family History


  ** Father


Family Medical History: No Reported History


Additional Family Medical History / Comment(s): FATHER  AT AGE 70. PT UNSURE

WHAT EXACTLY HE PASSED AWAY FROM. Had arthritis and both legs amputated.





  ** Mother


Family Medical History: Congestive Heart Failure (CHF)


Additional Family Medical History / Comment(s): MOTHER  AT AGE 91 YRS.





  ** Son(s)


Family Medical History: Cancer


Additional Family Medical History / Comment(s): Brain tumor, .





  ** Daughter(s)


Family Medical History: Deep Vein Thrombosis (DVT)





<Ernestina De La O - Last Filed: 24 15:04>





General Exam





<Ernestina De La O - Last Filed: 24 15:04>


General appearance: alert, in no apparent distress


Head exam: Present: atraumatic, normocephalic


Eye exam: Present: normal appearance, PERRL


ENT exam: Present: normal exam


Neck exam: Present: normal inspection


Respiratory exam: Present: normal lung sounds bilaterally.  Absent: respiratory 

distress, wheezes


Cardiovascular Exam: Present: regular rate, normal rhythm


GI/Abdominal exam: Present: soft.  Absent: distended, tenderness, guarding


Extremities exam: Present: normal inspection, normal capillary refill


Neurological exam: Present: alert, oriented X3, CN II-XII intact, other (NIH of 

0).  Absent: motor sensory deficit


Psychiatric exam: Present: normal affect, normal mood


Skin exam: Present: warm, dry, intact.  Absent: cyanosis, diaphoretic





<Balta Valentin - Last Filed: 24 17:25>





- General Exam Comments


Initial Comments: 





Visual Physical Exam





Vital signs reviewed





General: Well-appearing, nontoxic, no acute distress.


Head: Normocephalic, atraumatic


Eyes: PERRLA, EOMI


ENT: Airway patent


Chest: Nonlabored breathing


Skin: No visual rash, normal skin tone


Neuro: Alert and oriented 3


Musculoskeletal: No gross abnormalities


 (Ernestina De La O)





Course


                                   Vital Signs











  24





  15:12


 


Temperature 98.0 F


 


Pulse Rate 61


 


Respiratory 18





Rate 


 


Blood Pressure 161/83


 


O2 Sat by Pulse 94 L





Oximetry 














Medical Decision Making





<Ernestina De La O - Last Filed: 24 15:04>





- Lab Data


Result diagrams: 


                                 24 15:13





                                 24 15:13





<Balta Valentin - Last Filed: 24 17:25>





- Medical Decision Making


I performed the quick note portion of this chart.  Electronically signed by  

Ernestina De La O PA-C (Ernestina De La O)


Was pt. sent in by a medical professional or institution (TIANNA Wolff, NP, urgent 

care, hospital, or nursing home...) When possible be specific


@  -No


Did you speak to anyone other than the patient for history (EMS, parent, family,

police, friend...)? What history was obtained from this source 


@  -No


Did you review nursing and triage notes (agree or disagree)?  Why? 


@  -I reviewed and agree with nursing and triage notes


Were old charts reviewed (outside hosp., previous admission, EMS record, old E

KG, old radiological studies, urgent care reports/EKG's, nursing home records)? 

Report findings 


@  -No old charts were reviewed


Differential Diagnosis differential CVA


Ischemic stroke, hemorrhagic stroke, brain tumor, atypical migraine, Wernicke's 

encephalopathy, seizure, multiple sclerosis, meningitis, encephalitis, 

hypoglycemia, Guillain-Barr, electrolytes disturbance, myasthenia gravis.... Th

is is not meant to be an all-inclusive list


EKG interpreted by me (3pts min.).


@  -EKG sinus bradycardia rate of 52, ME interval 199, QRS duration 100, QTc 412

no ST segment elevation.


X-rays interpreted by me (1pt min.).


@  -None done


CT interpreted by me (1pt min.).


@CT brain without intracranial hemorrhage or mass effect


U/S interpreted by me (1pt. min.).


@  -None done


What testing was considered but not performed or refused? (CT, X-rays, U/S, 

labs)? Why?


@  -None


What meds were considered but not given or refused? Why?


@  -None


Did you discuss the management of the patient with other professionals 

(professionals i.e. , PA, NP, lab, RT, psych nurse, , , 

teacher, , )? Give summary


@  -No


Was smoking cessation discussed for >3mins.?


@  -No


Was critical care preformed (if so, how long)?


@  -No


Were there social determinants of health that impacted care today? How? 

(Homelessness, low income, unemployed, alcoholism, drug addiction, transpor

tation, low edu. Level, literacy, decrease access to med. care, residential, rehab)?


@  -No


Was there de-escalation of care discussed even if they declined (Discuss DNR or 

withdrawal of care, Hospice)? DNR status


@  -No


What co-morbidities impacted this encounter? (DM, HTN, Smoking, COPD, CAD, 

Cancer, CVA, ARF, Chemo, Hep., AIDS, mental health diagnosis, sleep apnea, 

morbid obesity)?


@  -Previous TIA


Was patient admitted / discharged? Hospital course, mention meds given and 

route, prescriptions, significant lab abnormalities, going to OR and other 

pertinent info.


@  -[78-year-old female with intermittent slurred speech over the past 3 days.  

Symptoms resolved at the time my evaluation.  NIH of 0.  Patient is on aspirin, 

Plavix and Lipitor.  No headache.  Head CT negative for intracranial emergent 

mass effect.  Normal laboratory testing.  I did discuss the possibility of 

admission for further TIA stroke evaluation in this patient including MRI and 

neurology consultation.  Patient prefers discharge with close outpatient follow-

up with her primary care provider.  I did inform the patient that if her 

symptoms should return or she should develop any other stroke symptoms she 

should return to the emergency department immediately.  She will continue taking

 medications as prescribed.


Undiagnosed new problem with uncertain prognosis?


@  -No


Drug Therapy requiring intensive monitoring for toxicity (Heparin, Nitro, 

Insulin, Cardizem)?


@  -No


Were any procedures done?


@  -No


Diagnosis/symptom?


@  -TIA


Acute, or Chronic, or Acute on Chronic?


@  -[Acute


Uncomplicated (without systemic symptoms) or Complicated (systemic symptoms)?


@  -Default


Side effects of treatment?


@  -No


Exacerbation, Progression, or Severe Exacerbation?


@  -No


Poses a threat to life or bodily function? How? (Chest pain, USA, MI, pneumonia,

 PE, COPD, DKA, ARF, appy, cholecystitis, CVA, Diverticulitis, Homicidal, Suici

migdalia, threat to staff... and all critical care pts)


@  -Low risk at this time


 (Balta Valentin)





- Lab Data


                                   Lab Results











  24 Range/Units





  15:13 15:13 


 


WBC  8.5   (3.8-10.6)  k/uL


 


RBC  4.65   (3.80-5.40)  m/uL


 


Hgb  11.8   (11.4-16.0)  gm/dL


 


Hct  37.9   (34.0-46.0)  %


 


MCV  81.6   (80.0-100.0)  fL


 


MCH  25.3   (25.0-35.0)  pg


 


MCHC  31.0   (31.0-37.0)  g/dL


 


RDW  15.0   (11.5-15.5)  %


 


Plt Count  268   (150-450)  k/uL


 


MPV  8.6   


 


Sodium   139  (137-145)  mmol/L


 


Potassium   4.0  (3.5-5.1)  mmol/L


 


Chloride   104  ()  mmol/L


 


Carbon Dioxide   28  (22-30)  mmol/L


 


Anion Gap   7  mmol/L


 


BUN   20 H  (7-17)  mg/dL


 


Creatinine   0.86  (0.52-1.04)  mg/dL


 


Est GFR (CKD-EPI)AfAm   75  (>60 ml/min/1.73 sqM)  


 


Est GFR (CKD-EPI)NonAf   65  (>60 ml/min/1.73 sqM)  


 


Glucose   94  (74-99)  mg/dL


 


Calcium   10.3 H  (8.4-10.2)  mg/dL


 


Total Bilirubin   0.4  (0.2-1.3)  mg/dL


 


AST   21  (14-36)  U/L


 


ALT   15  (4-34)  U/L


 


Alkaline Phosphatase   134 H  ()  U/L


 


Total Protein   6.8  (6.3-8.2)  g/dL


 


Albumin   4.2  (3.5-5.0)  g/dL














Disposition





<Ernestina De La O - Last Filed: 24 15:04>


Is patient prescribed a controlled substance at d/c from ED?: No


Time of Disposition: 17:04





<Balta Valentin - Last Filed: 24 17:25>


Clinical Impression: 


 Transient cerebral ischemia





Disposition: HOME SELF-CARE


Condition: Fair


Instructions (If sedation given, give patient instructions):  Transient Ischemic

 Attack (ED)


Additional Instructions: 


Please follow closely with your primary care provider.  Please return to the 

emergency department with any new or worsening symptoms, return immediately.


Referrals: 


Sylwia Slaughter MD [Primary Care Provider] - 1-2 days

## 2024-04-14 NOTE — CT
EXAMINATION TYPE: CT brain wo con

CT DLP: 1138.4 mGycm, Automated exposure control for dose reduction was used.

 

DATE OF EXAM: 4/14/2024 4:14 PM

 

COMPARISON: CT head 11/22/2017.

 

CLINICAL INDICATION:Female, 78 years old with history of slurring words, slurring words

 

TECHNIQUE: 

Brain: Axial CT images of the brain were obtained with coronal and sagittal reformats created and rev
iewed.

Contrast used: None.

Oral contrast used: None.

 

FINDINGS:

 

Brain:

Extra-axial spaces: No abnormal extra-axial fluid collections.

Ventricular system: Within normal limits

Cerebral parenchyma: No acute intraparenchymal hemorrhage or mass effect.  The gray-white junction is
 well differentiated. Scattered hypoattenuating areas are seen within the white matter.  Mild general
ized parenchymal volume loss.

Cerebellum: Unremarkable.

Mass effect: No evidence of midline shift.

Intracranial vasculature: Atherosclerotic calcifications of the intracranial vessels.

Soft tissues: Normal.

Calvarium/osseous structures: No depressed skull fracture.

Paranasal sinuses and mastoid air cells: Mild scattered paranasal sinus disease.

Visualized orbits: Orbital contents are intact.

 

 

IMPRESSION:

1. No acute intracranial process.

2. Nonspecific white matter changes, likely secondary to chronic small vessel ischemic disease.

## 2024-04-19 ENCOUNTER — HOSPITAL ENCOUNTER (OUTPATIENT)
Dept: HOSPITAL 47 - RADUSWWP | Age: 79
Discharge: HOME | End: 2024-04-19
Attending: FAMILY MEDICINE
Payer: MEDICARE

## 2024-04-19 DIAGNOSIS — R47.81: ICD-10-CM

## 2024-04-19 DIAGNOSIS — I65.23: Primary | ICD-10-CM

## 2024-04-19 PROCEDURE — 93880 EXTRACRANIAL BILAT STUDY: CPT

## 2024-04-19 NOTE — US
EXAMINATION TYPE: US carotid duplex BILAT

 

DATE OF EXAM: 4/19/2024

 

COMPARISON: 05/16/2021

 

CLINICAL INDICATION: Female, 78 years old with history of R47.81 SLURRED SPEECH; HTN; Slurred speech 
x 1 week 

 

TECHNIQUE: Carotid duplex ultrasound examination. Indirect Doppler criteria was utilized.

 

FINDINGS:

 

EXAM MEASUREMENTS: 

 

RIGHT:  Peak Systolic Velocity (PSV) cm/sec

----- Right CCA:  75  

----- Right ICA:  211     

----- Right ECA:  117   

ICA/CCA ratio:  2.8    

 

RIGHT:  End Diastole cm/sec

----- Right CCA:  10   

----- Right ICA:  8      

----- Right ECA:  0     

 

LEFT:  Peak Systolic Velocity (PSV) cm/sec

----- Left CCA:  64  

----- Left ICA:  122   

----- Left ECA:  72  

ICA/CCA ratio:  1.9  

 

LEFT:  End Diastole cm/sec

----- Left CCA:  12  

----- Left ICA:  33   

----- Left ECA:  10 

 

VERTEBRALS (direction of flow):

Right Vertebral: Antegrade

Left Vertebral: Antegrade

 

Rhythm:  Normal

 

SONOGRAPHER NOTES: Extensive plaque bilaterally, increased right ICA velocities, no intimal thickenin
g, and tortuous bilaterally 

 

 

 

IMPRESSION:  

 

1. Marked atherosclerotic plaque in the right carotid bifurcation resulting in a moderate 50-69% righ
t internal carotid artery stenosis. There has been significant interval worsening compared to the tsering
or study.

2. Stable Scattered moderate atherosclerotic plaque in the left carotid bifurcation but no hemodynami
nini significant stenosis based on peak systolic velocities and ratios.   

 

 

 

 

 

Criteria for Assigning % of Stenosis / Diameter reduction

(Estimation based on the indirect measurements of the internal carotid artery velocities (ICA PSV).

1.  Normal (no stenosis)=ICA PSV < 125 cm/s: ratio < 2.0: ICA EDV<40 cm/s.

2. Less than 50% stenosis=ICA PSV < 125 cm/s: ratio < 2.0: ICA EDV<40 cm/s.

3.  50 to 69% stenosis=ICA PSV of 125 to 230 cm/s: ration 2.0 ? 4.0: ICA EDV  cm/s.

4.  Greater than 70% stenosis to near occlusion= ICA PSV > 230 cm/s: ratio > 4.0: ICA EDV > 100 cm/s.
 

5.  Near occlusion= ICA PSV velocities may be low or undetectable: variable ratio and ICA EDV.

6.  Total occlusion=unable to detect flow.

## 2024-04-22 ENCOUNTER — HOSPITAL ENCOUNTER (OUTPATIENT)
Dept: HOSPITAL 47 - RADMRIMAIN | Age: 79
Discharge: HOME | End: 2024-04-22
Attending: FAMILY MEDICINE
Payer: MEDICARE

## 2024-04-22 DIAGNOSIS — G31.9: Primary | ICD-10-CM

## 2024-04-22 PROCEDURE — 70553 MRI BRAIN STEM W/O & W/DYE: CPT

## 2024-04-23 NOTE — MR
EXAMINATION TYPE: MR brain wo/w con

 

DATE OF EXAM: 4/22/2024

 

COMPARISON: Prior MRI brain May 17, 2021

 

HISTORY: Slurred speech

 

TECHNIQUE: 

Multiplanar, multisequence images of the brain and brainstem is performed without and with IV contras
t, utilizing 7.5 mL intravenous Gadavist .

 

FINDINGS: Diffusion weighted images demonstrate no evidence of a recent infarct or other diffusion ab
normality.  There is mild to moderate ventricular and sulcal prominence redemonstrated. There are mul
tifocal areas of T2 hyperintensity throughout the white matter bilaterally. Lesions are nonspecific i
n appearance and distribution.

 

Midline structures redemonstrate normal morphology.  The craniocervical junction remaining within nor
mal limits.  Post contrast images demonstrate no abnormal enhancement. The dural venous sinuses appea
r patent. The visualized sinuses are clear and the globes are intact. Nasal septum is deviated to rig
ht of midline.

 

IMPRESSION: 

1. No MRI evidence for a recent infarct.

2. There is mild to moderate diffuse cerebral atrophy and moderate to advanced probable chronic small
 vessel ischemic change redemonstrated with some interval degenerative progression from 2021 MRI note
d. No suspicious enhancement is seen.

## 2024-05-02 ENCOUNTER — HOSPITAL ENCOUNTER (OUTPATIENT)
Dept: HOSPITAL 47 - RADCTMAIN | Age: 79
Discharge: HOME | End: 2024-05-02
Attending: INTERNAL MEDICINE
Payer: MEDICARE

## 2024-05-02 DIAGNOSIS — I65.23: Primary | ICD-10-CM

## 2024-05-02 LAB — BUN SERPL-SCNC: 18 MG/DL (ref 7–17)

## 2024-05-02 PROCEDURE — 84520 ASSAY OF UREA NITROGEN: CPT

## 2024-05-02 PROCEDURE — 36415 COLL VENOUS BLD VENIPUNCTURE: CPT

## 2024-05-02 PROCEDURE — 70498 CT ANGIOGRAPHY NECK: CPT

## 2024-05-02 PROCEDURE — 82565 ASSAY OF CREATININE: CPT

## 2024-05-02 NOTE — CT
EXAMINATION TYPE: CT angio neck

 

DATE OF EXAM: 5/2/2024 1:00 PM

 

CLINICAL INDICATION:Female, 78 years old with history of I65.29 OCCLUSION AND STENOSIS OF UNSPECIFIED
 CAROT; occlusion and stenosis of carotid artery

 

COMPARISON: 11/22/2017

 

TECHNIQUE: Axially acquired helical CT Angiogram of the Neck was obtained with and without contrast. 
Axial images are supplemented with coronal and sagittal MIP reconstructions. 3D reconstructions were 
also performed and were post-processed at an independent workstation. Estimated carotid stenosis was 
calculated using the NASCET criteria. 

CT DLP: 321.50 mGycm, Automated exposure control for dose reduction was used.

Contrast used:65 mL of Isovue 370 with IV Contrast, 

Oral contrast used: , None.

 

FINDINGS: 

Diminutive right A1 segment.

 

CTA NECK:

Right Carotid System: 

The common carotid and external carotid arteries are patent. There is approximately 25% stenosis at t
he carotid bifurcation secondary to calcified/noncalcified plaquing. The rest of the internal carotid
 artery is patent.

 

Left Carotid System: 

The common carotid and external carotid arteries are patent. There is approximately 25% stenosis at t
he carotid bifurcation secondary to calcified/noncalcified plaquing. The rest of the internal carotid
 artery is patent.

 

Vertebral arteries are patent without evidence hemodynamically significant stenosis. Dominant left ve
rtebral artery.

 

There is a three-vessel aortic arch. The origins of the great vessels are patent. No evidence of hemo
dynamically significant stenosis.

 

Upper thorax: Mild to moderate centrilobular emphysema changes of the lungs.

 

IMPRESSION:

No evidence of dissection of the cervical internal carotid arteries or vertebral arteries or any evid
ence of significant stenosis at the carotid bifurcations.  Less than 25% stenosis of the carotid bifu
rcations.

## 2024-07-28 ENCOUNTER — HOSPITAL ENCOUNTER (OUTPATIENT)
Dept: HOSPITAL 47 - EC | Age: 79
Setting detail: OBSERVATION
LOS: 2 days | Discharge: HOME | End: 2024-07-30
Attending: HOSPITALIST | Admitting: HOSPITALIST
Payer: MEDICARE

## 2024-07-28 DIAGNOSIS — F41.9: ICD-10-CM

## 2024-07-28 DIAGNOSIS — Z87.891: ICD-10-CM

## 2024-07-28 DIAGNOSIS — Z96.643: ICD-10-CM

## 2024-07-28 DIAGNOSIS — Z95.5: ICD-10-CM

## 2024-07-28 DIAGNOSIS — R27.0: Primary | ICD-10-CM

## 2024-07-28 DIAGNOSIS — I95.1: ICD-10-CM

## 2024-07-28 DIAGNOSIS — Z11.52: ICD-10-CM

## 2024-07-28 DIAGNOSIS — I10: ICD-10-CM

## 2024-07-28 DIAGNOSIS — Z86.73: ICD-10-CM

## 2024-07-28 DIAGNOSIS — J44.9: ICD-10-CM

## 2024-07-28 DIAGNOSIS — R05.9: ICD-10-CM

## 2024-07-28 DIAGNOSIS — E87.5: ICD-10-CM

## 2024-07-28 DIAGNOSIS — I25.10: ICD-10-CM

## 2024-07-28 DIAGNOSIS — Z79.82: ICD-10-CM

## 2024-07-28 DIAGNOSIS — R53.1: ICD-10-CM

## 2024-07-28 DIAGNOSIS — Z79.02: ICD-10-CM

## 2024-07-28 DIAGNOSIS — F32.A: ICD-10-CM

## 2024-07-28 DIAGNOSIS — Z79.899: ICD-10-CM

## 2024-07-28 LAB
ALBUMIN SERPL-MCNC: 4.2 G/DL (ref 3.5–5)
ALP SERPL-CCNC: 95 U/L (ref 38–126)
ALT SERPL-CCNC: 11 U/L (ref 4–34)
ANION GAP SERPL CALC-SCNC: 6 MMOL/L
AST SERPL-CCNC: 34 U/L (ref 14–36)
BASOPHILS # BLD AUTO: 0.1 K/UL (ref 0–0.2)
BASOPHILS NFR BLD AUTO: 1 %
BUN SERPL-SCNC: 23 MG/DL (ref 7–17)
CALCIUM SPEC-MCNC: 9.8 MG/DL (ref 8.4–10.2)
CHLORIDE SERPL-SCNC: 108 MMOL/L (ref 98–107)
CO2 SERPL-SCNC: 24 MMOL/L (ref 22–30)
EOSINOPHIL # BLD AUTO: 0.1 K/UL (ref 0–0.7)
EOSINOPHIL NFR BLD AUTO: 2 %
ERYTHROCYTE [DISTWIDTH] IN BLOOD BY AUTOMATED COUNT: 4.65 M/UL (ref 3.8–5.4)
ERYTHROCYTE [DISTWIDTH] IN BLOOD: 14.7 % (ref 11.5–15.5)
GLUCOSE SERPL-MCNC: 86 MG/DL (ref 74–99)
HCT VFR BLD AUTO: 38.7 % (ref 34–46)
HGB BLD-MCNC: 12.1 GM/DL (ref 11.4–16)
INR PPP: 0.9 (ref ?–1.2)
LYMPHOCYTES # SPEC AUTO: 2.8 K/UL (ref 1–4.8)
LYMPHOCYTES NFR SPEC AUTO: 38 %
MCH RBC QN AUTO: 26.1 PG (ref 25–35)
MCHC RBC AUTO-ENTMCNC: 31.3 G/DL (ref 31–37)
MCV RBC AUTO: 83.4 FL (ref 80–100)
MONOCYTES # BLD AUTO: 0.4 K/UL (ref 0–1)
MONOCYTES NFR BLD AUTO: 6 %
NEUTROPHILS # BLD AUTO: 3.9 K/UL (ref 1.3–7.7)
NEUTROPHILS NFR BLD AUTO: 52 %
NT-PROBNP SERPL-MCNC: 469 PG/ML
PH UR: 5.5 [PH] (ref 5–8)
PLATELET # BLD AUTO: 241 K/UL (ref 150–450)
POTASSIUM SERPL-SCNC: 5.3 MMOL/L (ref 3.5–5.1)
PROT SERPL-MCNC: 6.8 G/DL (ref 6.3–8.2)
PT BLD: 10.4 SEC (ref 10–12.5)
RBC UR QL: 2 /HPF (ref 0–5)
SODIUM SERPL-SCNC: 138 MMOL/L (ref 137–145)
SP GR UR: 1.03 (ref 1–1.03)
SQUAMOUS UR QL AUTO: 1 /HPF (ref 0–4)
UROBILINOGEN UR QL STRIP: 2 MG/DL (ref ?–2)
WBC # BLD AUTO: 7.4 K/UL (ref 3.8–10.6)
WBC # UR AUTO: 43 /HPF (ref 0–5)

## 2024-07-28 PROCEDURE — 85610 PROTHROMBIN TIME: CPT

## 2024-07-28 PROCEDURE — 87086 URINE CULTURE/COLONY COUNT: CPT

## 2024-07-28 PROCEDURE — 83880 ASSAY OF NATRIURETIC PEPTIDE: CPT

## 2024-07-28 PROCEDURE — 93005 ELECTROCARDIOGRAM TRACING: CPT

## 2024-07-28 PROCEDURE — 87636 SARSCOV2 & INF A&B AMP PRB: CPT

## 2024-07-28 PROCEDURE — 93306 TTE W/DOPPLER COMPLETE: CPT

## 2024-07-28 PROCEDURE — 86140 C-REACTIVE PROTEIN: CPT

## 2024-07-28 PROCEDURE — 80306 DRUG TEST PRSMV INSTRMNT: CPT

## 2024-07-28 PROCEDURE — 70450 CT HEAD/BRAIN W/O DYE: CPT

## 2024-07-28 PROCEDURE — 84484 ASSAY OF TROPONIN QUANT: CPT

## 2024-07-28 PROCEDURE — 93880 EXTRACRANIAL BILAT STUDY: CPT

## 2024-07-28 PROCEDURE — 85379 FIBRIN DEGRADATION QUANT: CPT

## 2024-07-28 PROCEDURE — 81001 URINALYSIS AUTO W/SCOPE: CPT

## 2024-07-28 PROCEDURE — 96365 THER/PROPH/DIAG IV INF INIT: CPT

## 2024-07-28 PROCEDURE — 36415 COLL VENOUS BLD VENIPUNCTURE: CPT

## 2024-07-28 PROCEDURE — 99285 EMERGENCY DEPT VISIT HI MDM: CPT

## 2024-07-28 PROCEDURE — 80053 COMPREHEN METABOLIC PANEL: CPT

## 2024-07-28 PROCEDURE — 85025 COMPLETE CBC W/AUTO DIFF WBC: CPT

## 2024-07-28 PROCEDURE — 85652 RBC SED RATE AUTOMATED: CPT

## 2024-07-28 PROCEDURE — 84145 PROCALCITONIN (PCT): CPT

## 2024-07-28 PROCEDURE — 71046 X-RAY EXAM CHEST 2 VIEWS: CPT

## 2024-07-28 RX ADMIN — CEPHALEXIN STA MG: 500 CAPSULE ORAL at 23:39

## 2024-07-28 RX ADMIN — MECLIZINE STA MG: 12.5 TABLET ORAL at 20:50

## 2024-07-28 NOTE — CT
EXAMINATION TYPE: CT brain wo con

CT DLP: 1166.4 mGycm, Automated exposure control for dose reduction was used.

 

DATE OF EXAM: 7/28/2024 9:11 PM

 

COMPARISON: .

 

CLINICAL INDICATION:Female, 78 years old with history of dizziness, Patient states that she feels lik
e she is going to fall when walking.

 

TECHNIQUE: 

Brain: Axial CT images of the brain were obtained with coronal and sagittal reformats created and rev
iewed.

Contrast used: None.

Oral contrast used: None.

 

FINDINGS:

 

Brain:

Extra-axial spaces: No abnormal extra-axial fluid collections.   Extra-axial CSF spaces are increased
.

Ventricular system: Ventricles and sulci are prominent indicating involutional change. The process is
 asymmetric involving frontal lobes greater than parietal lobes.

Cerebral parenchyma: No acute intraparenchymal hemorrhage or mass effect.  The gray-white junction is
 well differentiated.     

Cerebellum: Unremarkable.

Mass effect: No evidence of midline shift.

Intracranial vasculature: unremarkable

Soft tissues: Normal.

Calvarium/osseous structures: No depressed skull fracture.

Paranasal sinuses and mastoid air cells: Mild scattered paranasal sinus disease.

Visualized orbits: Orbital contents are intact.

 

 

IMPRESSION:

No acute intracranial process.

Frontal lobes asymmetrically involuted.

## 2024-07-28 NOTE — XR
EXAMINATION TYPE: XR chest 2V

 

DATE OF EXAM: 7/28/2024 9:18 PM

 

CLINICAL INDICATION:Female, 78 years old with history of cough; PHH

 

COMPARISON: Chest radiographs from 01/06/2024 and 10/24/2021

 

TECHNIQUE: XR chest 2V Frontal and lateral views of the chest.

 

FINDINGS: 

Lungs/Pleura: There is no evidence of pleural effusion, focal consolidation, or pneumothorax.  Calcif
ied granuloma in the right base is stable since prior exam of 10/24/2021.

Pulmonary vascularity: Unremarkable.

Heart/mediastinum: Cardiomediastinal silhouette is unremarkable.

Musculoskeletal: No acute osseous pathology.

 

Other findings: None

 

Lines/Tubes:

 

 

 

 

IMPRESSION: 

No acute cardiopulmonary disease/process.

## 2024-07-28 NOTE — ED
General Adult HPI





- General


Source: patient, RN notes reviewed


Mode of arrival: ambulatory


Limitations: no limitations





<Mirian Louis - Last Filed: 24 23:26>





<Brandy Arriola - Last Filed: 24 01:04>





- General


Chief complaint: Dizziness


Stated complaint: Dizziness


Time Seen by Provider: 24 19:21





- History of Present Illness


Initial comments: 





78-year-old female presents to the emergency department for evaluation of being 

unsteady on her feet and unable to walk straight. She states that this started 

on Friday. She reports that she has no symptoms while she is lying in bed. She 

denies dizziness or lightheadedness. Denies recent illness, fever. She does 

admit to cough for the same time period.  (Mirian Louis)





- Related Data


                                Home Medications











 Medication  Instructions  Recorded  Confirmed


 


amLODIPine BESYLATE [Norvasc] 5 mg PO BID 16


 


Omeprazole 20 mg PO QAM 17


 


Atorvastatin [Lipitor] 40 mg PO HS 21


 


Ergocalciferol [Vitamin D2 (1250 1,250 mcg PO FR 21





Mcg = 02600 Iu)]   


 


Aspirin EC [Ecotrin Low Dose] 81 mg PO DAILY 10/24/21 12/07/23


 


Thiamine [Vitamin B-1] 100 mg PO DAILY 10/24/21 12/12/23


 


OLANZapine 5 mg PO HS 23


 


hydrALAZINE HCL [Apresoline] 25 mg PO TID 23


 


hydroCHLOROthiazide [Hydrodiuril] 25 mg PO QAM 23


 


clonazePAM [KlonoPIN] 0.5 mg PO QAM 23








                                  Previous Rx's











 Medication  Instructions  Recorded


 


Clopidogrel [Plavix] 75 mg PO DAILY #30 tab 17


 


HYDROcodone/APAP 7.5-325MG [Norco 1 - 2 tab PO Q6HR PRN #32 tab 23





7.5-325]  


 


Sennosides/Docusate Sodium [Senna 1 each PO DAILY #20 capsule 23





Plus 8.6-50 mg Softgel]  


 


Cephalexin [Keflex] 500 mg PO Q6HR #21 cap 24











                                    Allergies











Allergy/AdvReac Type Severity Reaction Status Date / Time


 


zolpidem tartrate AdvReac Severe Hallucinati Verified 24 19:01





[From Ambien]   ons  














Review of Systems


ROS Other: All systems not noted in ROS Statement are negative.





<Mirian Louis - Last Filed: 24 23:26>


ROS Other: All systems not noted in ROS Statement are negative.





<Brandy Arriola - Last Filed: 24 01:04>


ROS Statement: 


Those systems with pertinent positive or pertinent negative responses have been 

documented in the HPI.








Past Medical History


Past Medical History: Coronary Artery Disease (CAD), Chest Pain / Angina, COPD, 

Hypertension, Syncope, Thyroid Disorder


Additional Past Medical History / Comment(s): parathyroid nodules, high calcium


Last Myocardial Infarction Date:: 2015


History of Any Multi-Drug Resistant Organisms: None Reported


Past Surgical History: Heart Catheterization With Stent


Additional Past Surgical History / Comment(s): cyst removed from back, heart 

cath with a stent placed 2015. parathyroid removal


Past Anesthesia/Blood Transfusion Reactions: No Reported Reaction


Date of Last Stent Placement:: 2015


Past Psychological History: Anxiety, Depression


Smoking Status: Former smoker


Past Alcohol Use History: Occasional, Rare


Past Drug Use History: None Reported





- Past Family History


  ** Father


Family Medical History: No Reported History


Additional Family Medical History / Comment(s): FATHER  AT AGE 70. PT UNSURE

WHAT EXACTLY HE PASSED AWAY FROM. Had arthritis and both legs amputated.





  ** Mother


Family Medical History: Congestive Heart Failure (CHF)


Additional Family Medical History / Comment(s): MOTHER  AT AGE 91 YRS.





  ** Son(s)


Family Medical History: Cancer


Additional Family Medical History / Comment(s): Brain tumor, .





  ** Daughter(s)


Family Medical History: Deep Vein Thrombosis (DVT)





<Mirian Louis - Last Filed: 24 23:26>





General Exam


Limitations: no limitations


General appearance: alert, in no apparent distress


Head exam: Present: atraumatic, normocephalic, normal inspection


Eye exam: Present: normal appearance, PERRL, EOMI.  Absent: scleral icterus, 

conjunctival injection, periorbital swelling


ENT exam: Present: normal exam, mucous membranes moist


Neck exam: Present: normal inspection.  Absent: tenderness, meningismus, 

lymphadenopathy


Respiratory exam: Present: normal lung sounds bilaterally.  Absent: respiratory 

distress, wheezes, rales, rhonchi, stridor


Cardiovascular Exam: Present: regular rate, normal rhythm, normal heart sounds. 

Absent: systolic murmur, diastolic murmur, rubs, gallop, clicks


Extremities exam: Present: normal inspection, full ROM, normal capillary refill.

 Absent: tenderness, pedal edema, joint swelling, calf tenderness


Back exam: Present: normal inspection


Neurological exam: Present: alert, oriented X3, CN II-XII intact, abnormal gait


Psychiatric exam: Present: normal affect, normal mood


Skin exam: Present: warm, dry, intact, normal color.  Absent: rash





<Mirian Louis - Last Filed: 24 23:26>





Course





                                   Vital Signs











  24





  18:59 19:54 20:00


 


Temperature 98.5 F  


 


Pulse Rate 61  60


 


Respiratory 18 18 16





Rate   


 


Blood Pressure 133/61  150/64


 


O2 Sat by Pulse 95 97 97





Oximetry   














  24





  21:00 22:44 00:00


 


Temperature   


 


Pulse Rate 62 61 59 L


 


Respiratory 16 18 22





Rate   


 


Blood Pressure 155/86 143/75 164/72


 


O2 Sat by Pulse 97 96 95





Oximetry   














Medical Decision Making





- Lab Data


Result diagrams: 


                                 24 20:23





                                 24 20:23





<Mirian Louis - Last Filed: 24 23:26>





- Lab Data


Result diagrams: 


                                 24 20:23





                                 24 20:23





<Brandy Arriola - Last Filed: 24 01:04>





- Medical Decision Making





Was pt. sent in by a medical professional or institution (, PA, NP, urgent 

care, hospital, or nursing home...) When possible be specific


@  -No


Did you speak to anyone other than the patient for history (EMS, parent, family,

police, friend...)? What history was obtained from this source 


@  -No


Did you review nursing and triage notes (agree or disagree)?  Why? 


@  -I reviewed and agree with nursing and triage notes


Were old charts reviewed (outside hosp., previous admission, EMS record, old 

EKG, old radiological studies, urgent care reports/EKG's, nursing home records)?

Report findings 


@  -No old charts were reviewed


Differential Diagnosis (chest pain, altered mental status, abdominal pain women,

abdominal pain men, vaginal bleeding, weakness, fever, dyspnea, syncope, 

headache, dizziness, GI bleed, back pain, seizure, CVA, palpatations, mental 

health, musculoskeletal)? 


@  -Differential Dizziness:


Benign paroxysmal positional Vertigo, Menieres disease, otitis media, acoustic 

neuroma, vertebrobasilar insufficiency, cerebellar stroke, encephalitis, hy

povolemic, arrhythmia, coronary artery syndrome, anemia, this is not meant to be

an all-inclusive list


EKG interpreted by me (3pts min.).


@  -EKG at 1904 shows irregular rhythm rate 68, , QRS 90, QTQTc 231232


X-rays interpreted by me (1pt min.).


@  -Chest XR shows no acute process


CT interpreted by me (1pt min.).


@  -CT brain shows no acute process


U/S interpreted by me (1pt. min.).


@  -None done


What testing was considered but not performed or refused? (CT, X-rays, U/S, 

labs)? Why?


@  -None


What meds were considered but not given or refused? Why?


@  -None


Did you discuss the management of the patient with other professionals 

(professionals i.e. , PA, NP, lab, RT, psych nurse, , , 

teacher, , )? Give summary


@  -Case discussed with Lindy Brothers who is accepting of the admission


Was smoking cessation discussed for >3mins.?


@  -No


Was critical care preformed (if so, how long)?


@  -No


Were there social determinants of health that impacted care today? How? 

(Homelessness, low income, unemployed, alcoholism, drug addiction, transporta

tion, low edu. Level, literacy, decrease access to med. care, correction, rehab)?


@  -No


Was there de-escalation of care discussed even if they declined (Discuss DNR or 

withdrawal of care, Hospice)? DNR status


@  -No


What co-morbidities impacted this encounter? (DM, HTN, Smoking, COPD, CAD, 

Cancer, CVA, ARF, Chemo, Hep., AIDS, mental health diagnosis, sleep apnea, 

morbid obesity)?


@  -None


Was patient admitted / discharged? Hospital course, mention meds given and 

route, prescriptions, significant lab abnormalities, going to OR and other 

pertinent info.


@  -Admitted.  Patient presented to the emergency department for ataxia. Labs 

obtained CBC unremarkable, normal coagulation studies, negative troponin 0.017, 

; UA shows 43 WBCs, moderate leukocyte esterase; negative for COVID, 

influenza, RSV.  Chest x-ray shows no acute process.  CT brain shows no acute 

process. Patient given meclizine in ED with some improvement in symptoms. 

Patient will be admitted with neurology consultation. Case discussed with Lindy Brothers who is accepting of the admission. Patient stable at time of admission.


Case discussed with Dr. Arriola


Undiagnosed new problem with uncertain prognosis?


@  -No


Drug Therapy requiring intensive monitoring for toxicity (Heparin, Nitro, 

Insulin, Cardizem)?


@  -No


Were any procedures done?


@  -No


Diagnosis/symptom?


@  -Ataxia


Acute, or Chronic, or Acute on Chronic?


@  -acute


Uncomplicated (without systemic symptoms) or Complicated (systemic symptoms)?


@  -uncomplicated


Side effects of treatment?


@  -No


Exacerbation, Progression, or Severe Exacerbation?


@  -No


Poses a threat to life or bodily function? How? (Chest pain, USA, MI, pneumonia,

PE, COPD, DKA, ARF, appy, cholecystitis, CVA, Diverticulitis, Homicidal, 

Suicidal, threat to staff... and all critical care pts)


@  -No


 (Mirian Louis)





- Lab Data





                                   Lab Results











  24 Range/Units





  20:23 20:23 20:23 


 


WBC  7.4    (3.8-10.6)  k/uL


 


RBC  4.65    (3.80-5.40)  m/uL


 


Hgb  12.1    (11.4-16.0)  gm/dL


 


Hct  38.7    (34.0-46.0)  %


 


MCV  83.4    (80.0-100.0)  fL


 


MCH  26.1    (25.0-35.0)  pg


 


MCHC  31.3    (31.0-37.0)  g/dL


 


RDW  14.7    (11.5-15.5)  %


 


Plt Count  241    (150-450)  k/uL


 


MPV  9.5    


 


Neutrophils %  52    %


 


Lymphocytes %  38    %


 


Monocytes %  6    %


 


Eosinophils %  2    %


 


Basophils %  1    %


 


Neutrophils #  3.9    (1.3-7.7)  k/uL


 


Lymphocytes #  2.8    (1.0-4.8)  k/uL


 


Monocytes #  0.4    (0-1.0)  k/uL


 


Eosinophils #  0.1    (0-0.7)  k/uL


 


Basophils #  0.1    (0-0.2)  k/uL


 


Hypochromasia  Moderate    


 


PT   10.4   (10.0-12.5)  sec


 


INR   0.9   (<1.2)  


 


Sodium    138  (137-145)  mmol/L


 


Potassium    5.3 H  (3.5-5.1)  mmol/L


 


Chloride    108 H  ()  mmol/L


 


Carbon Dioxide    24  (22-30)  mmol/L


 


Anion Gap    6  mmol/L


 


BUN    23 H  (7-17)  mg/dL


 


Creatinine    0.76  (0.52-1.04)  mg/dL


 


Est GFR (CKD-EPI)AfAm    87  (>60 ml/min/1.73 sqM)  


 


Est GFR (CKD-EPI)NonAf    75  (>60 ml/min/1.73 sqM)  


 


Glucose    86  (74-99)  mg/dL


 


Calcium    9.8  (8.4-10.2)  mg/dL


 


Total Bilirubin    0.8  (0.2-1.3)  mg/dL


 


AST    34  (14-36)  U/L


 


ALT    11  (4-34)  U/L


 


Alkaline Phosphatase    95  ()  U/L


 


Troponin I     (0.000-0.034)  ng/mL


 


NT-Pro-B Natriuret Pep    469  pg/mL


 


Total Protein    6.8  (6.3-8.2)  g/dL


 


Albumin    4.2  (3.5-5.0)  g/dL


 


Urine Color     


 


Urine Appearance     (Clear)  


 


Urine pH     (5.0-8.0)  


 


Ur Specific Gravity     (1.001-1.035)  


 


Urine Protein     (Negative)  


 


Urine Glucose (UA)     (Negative)  


 


Urine Ketones     (Negative)  


 


Urine Blood     (Negative)  


 


Urine Nitrite     (Negative)  


 


Urine Bilirubin     (Negative)  


 


Urine Urobilinogen     (<2.0)  mg/dL


 


Ur Leukocyte Esterase     (Negative)  


 


Urine RBC     (0-5)  /hpf


 


Urine WBC     (0-5)  /hpf


 


Urine WBC Clumps     (None)  /hpf


 


Ur Squamous Epith Cells     (0-4)  /hpf


 


Urine Bacteria     (None)  /hpf


 


Urine Mucus     (None)  /hpf


 


Influenza Type A (PCR)     (Not Detectd)  


 


Influenza Type B (PCR)     (Not Detectd)  


 


RSV (PCR)     (Not Detectd)  


 


SARS-CoV-2 (PCR)     (Not Detectd)  














  24 Range/Units





  20:23 20:25 20:52 


 


WBC     (3.8-10.6)  k/uL


 


RBC     (3.80-5.40)  m/uL


 


Hgb     (11.4-16.0)  gm/dL


 


Hct     (34.0-46.0)  %


 


MCV     (80.0-100.0)  fL


 


MCH     (25.0-35.0)  pg


 


MCHC     (31.0-37.0)  g/dL


 


RDW     (11.5-15.5)  %


 


Plt Count     (150-450)  k/uL


 


MPV     


 


Neutrophils %     %


 


Lymphocytes %     %


 


Monocytes %     %


 


Eosinophils %     %


 


Basophils %     %


 


Neutrophils #     (1.3-7.7)  k/uL


 


Lymphocytes #     (1.0-4.8)  k/uL


 


Monocytes #     (0-1.0)  k/uL


 


Eosinophils #     (0-0.7)  k/uL


 


Basophils #     (0-0.2)  k/uL


 


Hypochromasia     


 


PT     (10.0-12.5)  sec


 


INR     (<1.2)  


 


Sodium     (137-145)  mmol/L


 


Potassium     (3.5-5.1)  mmol/L


 


Chloride     ()  mmol/L


 


Carbon Dioxide     (22-30)  mmol/L


 


Anion Gap     mmol/L


 


BUN     (7-17)  mg/dL


 


Creatinine     (0.52-1.04)  mg/dL


 


Est GFR (CKD-EPI)AfAm     (>60 ml/min/1.73 sqM)  


 


Est GFR (CKD-EPI)NonAf     (>60 ml/min/1.73 sqM)  


 


Glucose     (74-99)  mg/dL


 


Calcium     (8.4-10.2)  mg/dL


 


Total Bilirubin     (0.2-1.3)  mg/dL


 


AST     (14-36)  U/L


 


ALT     (4-34)  U/L


 


Alkaline Phosphatase     ()  U/L


 


Troponin I  0.017    (0.000-0.034)  ng/mL


 


NT-Pro-B Natriuret Pep     pg/mL


 


Total Protein     (6.3-8.2)  g/dL


 


Albumin     (3.5-5.0)  g/dL


 


Urine Color    Yellow  


 


Urine Appearance    Clear  (Clear)  


 


Urine pH    5.5  (5.0-8.0)  


 


Ur Specific Gravity    1.028  (1.001-1.035)  


 


Urine Protein    Trace H  (Negative)  


 


Urine Glucose (UA)    Negative  (Negative)  


 


Urine Ketones    Negative  (Negative)  


 


Urine Blood    Negative  (Negative)  


 


Urine Nitrite    Negative  (Negative)  


 


Urine Bilirubin    Negative  (Negative)  


 


Urine Urobilinogen    2.0  (<2.0)  mg/dL


 


Ur Leukocyte Esterase    Moderate H  (Negative)  


 


Urine RBC    2  (0-5)  /hpf


 


Urine WBC    43 H  (0-5)  /hpf


 


Urine WBC Clumps    Rare H  (None)  /hpf


 


Ur Squamous Epith Cells    1  (0-4)  /hpf


 


Urine Bacteria    Rare H  (None)  /hpf


 


Urine Mucus    Moderate H  (None)  /hpf


 


Influenza Type A (PCR)   Not Detected   (Not Detectd)  


 


Influenza Type B (PCR)   Not Detected   (Not Detectd)  


 


RSV (PCR)   Not Detected   (Not Detectd)  


 


SARS-CoV-2 (PCR)   Not Detected   (Not Detectd)  














Disposition


Is patient prescribed a controlled substance at d/c from ED?: No





<Mirian Louis - Last Filed: 24 23:26>





<Brandy Arriola - Last Filed: 24 01:04>


Clinical Impression: 


 Ataxia





Disposition: ADMITTED AS IP TO THIS HOSP


Condition: Stable

## 2024-07-29 RX ADMIN — ASPIRIN 81 MG CHEWABLE TABLET SCH MG: 81 TABLET CHEWABLE at 13:43

## 2024-07-29 RX ADMIN — ATORVASTATIN CALCIUM SCH MG: 40 TABLET, FILM COATED ORAL at 20:24

## 2024-07-29 RX ADMIN — CLOPIDOGREL BISULFATE SCH MG: 75 TABLET ORAL at 13:43

## 2024-07-29 NOTE — P.CNNES
History of Present Illness


Consult date: 24


Requesting physician: Mirian Louis


Reason for Consult: ataxia


History of Present Illness: 


This is a 78-year-old woman present emergency department because for the past 

couple days she felt she was off balance.  Patient denies any dizziness but she 

just felt off balance but denies any focal weakness numbness.  She denies any 

back pain.  Denies any speech difficulty vision issues that is new.  Denies any 

urinary or bowel issues.  She denies any recent sick contacts.  Denies any 

ringing in the ears or any new hearing issues.  She states that she has history 

of TIA in 2016 and she is on aspirin 81 mg.  She also has a history of coronary 

artery disease status post stent and is also on Plavix.  She has bilateral hip r

eplacement.  She feels she is doing better today.  Yesterday it seems that she 

was notified she has possibly urinary tract infection in which she was started 

on antibiotic as well as was given meclizine and today she is feeling 

drastically better.  Patient also states towards the end that as she has been 

coughing recently.  She denies any fever.  Denies any numbness that is new or 

that extending up or down.





Some of the work-up during this hospital visit consisted of:


Intervals unremarkable


Potassium is 5.3 and there is slight hemolysis


Urine Analysis is a leukocyte Estrace is moderate, urine white blood cell is 43,

urine bacteria is rare


CT of the head is reported as no acute intracranial process.  Frontal lobe 

asymmetrical involuted.  I personally reviewed the CT and there is no acute or 

subacute process.  I personally do not feel there is any difference in the CT 

compared to the prior CTs even going back to .  Patient does have 

bilateral frontal atrophy but again I felt that she had this in the past.





Of Note, patient had MRI of the brain most recent in 2024 and it is 

reported no MRI evidence for recent infarct.  There is mild to moderate cerebral

atrophy and moderate to advanced probable chronic small vessel ischemic changes 

redemonstrated with some interval degenerative progression from .  No 

suspicious enhancement is seen.








Review of Systems


The positive and negative as per HPI.








Past Medical History


Past Medical History: Coronary Artery Disease (CAD), Chest Pain / Angina, COPD, 

Hypertension, Syncope, Thyroid Disorder


Additional Past Medical History / Comment(s): parathyroid nodules, high calcium


Last Myocardial Infarction Date:: 2015


History of Any Multi-Drug Resistant Organisms: None Reported


Past Surgical History: Heart Catheterization With Stent


Additional Past Surgical History / Comment(s): cyst removed from back, heart 

cath with a stent placed 2015. parathyroid removal


Past Anesthesia/Blood Transfusion Reactions: No Reported Reaction


Date of Last Stent Placement:: 2015


Past Psychological History: Anxiety, Depression


Smoking Status: Former smoker


Past Alcohol Use History: Occasional, Rare


Past Drug Use History: None Reported





- Past Family History


  ** Father


Family Medical History: No Reported History


Additional Family Medical History / Comment(s): FATHER  AT AGE 70. PT UNSURE

WHAT EXACTLY HE PASSED AWAY FROM. Had arthritis and both legs amputated.





  ** Mother


Family Medical History: Congestive Heart Failure (CHF)


Additional Family Medical History / Comment(s): MOTHER  AT AGE 91 YRS.





  ** Son(s)


Family Medical History: Cancer


Additional Family Medical History / Comment(s): Brain tumor, .





  ** Daughter(s)


Family Medical History: Deep Vein Thrombosis (DVT)





Medications and Allergies


                                Home Medications











 Medication  Instructions  Recorded  Confirmed  Type


 


Omeprazole 20 mg PO DAILY 17 History


 


Clopidogrel [Plavix] 75 mg PO DAILY #30 tab 17 Rx


 


Atorvastatin [Lipitor] 40 mg PO HS 21 History


 


Ergocalciferol [Vitamin D2 (1250 1,250 mcg PO FR 21 History





Mcg = 37080 Iu)]    


 


Aspirin EC [Ecotrin Low Dose] 81 mg PO DAILY 10/24/21 07/29/24 History


 


OLANZapine 5 mg PO HS 23 History


 


hydrALAZINE HCL [Apresoline] 25 mg PO TID 23 History


 


hydroCHLOROthiazide [Hydrodiuril] 25 mg PO DAILY 23 History


 


Citalopram Hydrobromide 20 mg PO DAILY 24 History





[Citalopram HBr]    


 


amLODIPine [Norvasc] 5 mg PO BID 24 History








                                    Allergies











Allergy/AdvReac Type Severity Reaction Status Date / Time


 


zolpidem tartrate AdvReac Severe Hallucinati Verified 24 07:27





[From [facility]]   ons  














Physical Examination





- Vital Signs


Vital Signs: 


                                   Vital Signs











  Temp Pulse Resp BP Pulse Ox


 


 24 12:58   59 L  18  149/64  96


 


 24 07:28  97.5 F L  68  18  164/60  97


 


 24 06:00   55 L  18  114/79  94 L


 


 24 05:00   60  19  138/71  94 L


 


 24 04:00   57 L  14  154/73 


 


 24 03:00   58 L  22  135/98 


 


 24 02:00   51 L  18  162/83  95


 


 24 01:00   52 L  16  154/62 


 


 24 00:00   59 L  22  164/72  95


 


 24 22:44   61  18  143/75  96


 


 24 21:00   62  16  155/86  97


 


 24 20:00   60  16  150/64  97


 


 24 19:54    18   97


 


 24 18:59  98.5 F  61  18  133/61  95








                                Intake and Output











 24





 22:59 06:59 14:59


 


Other:   


 


  Weight 74.843 kg  











GENERAL: The patient is lying in bed and is not in acute distress.





NEUROLOGICAL:


Higher mental function: The patient is awake, alert, oriented to self, place and

time.  Patient is following commands.  No aphasia and no neglect.


Cranial nerves: The pupils are round, equal and reactive to light and ac

commodation.  Visual fields are full to confrontation throughout.  Extraocular 

movement is intact no nystagmus is noted.  Facial sensation is normal to touch 

throughout.  The facial strength is normal throughout.  Hearing is normal 

bilaterally to hand rub.  Tongue is midline and moved side-to-side without any 

difficulty.  No dysarthria is noted.  Shoulder shrug is normal bilaterally.


Motor:Gait is normal.  The strength is 5 over 5 throughout.  Normal tone and 

bulk.  


Cerebellum: Normal finger to nose heel to chin bilaterally.


Sensation: Sensation is normal to touch throughout.


Reflexes (right/left):Left knee is 0 and bilateral ankles are 1+.  Otherwise 2+ 

throughout.


Plantars are downgoing bilaterally.








Results





- Laboratory Findings


CBC and BMP: 


                                 24 20:23





                                 24 20:23


Abnormal Lab Findings: 


                                  Abnormal Labs











  24





  20:23 20:52 13:18


 


Potassium  5.3 H  


 


Chloride  108 H  


 


BUN  23 H  


 


C-Reactive Protein    1.2 H


 


Urine Protein   Trace H 


 


Ur Leukocyte Esterase   Moderate H 


 


Urine WBC   43 H 


 


Urine WBC Clumps   Rare H 


 


Urine Bacteria   Rare H 


 


Urine Mucus   Moderate H 














Assessment and Plan


Assessment: 


This is a 78-year-old woman presents to the emergency department because she 

felt she is off balance for the past few days.  Seems that she has acute urinary

tract infection.  She felt drastically better with antibiotic and meclizine.  

She feels her gait is normal and there is no balance issues.





Acute unsteady gait possibly acute urinary tract infection possible due to acute

respiratory tract infection since has recent cough.  Examination there is no 

focal deficit and patient is back to baseline.  Had multiple MRI of the brain 

and the most recent was on 2024 and there is no acute or subacute stroke 

and there is no enhancement.


Likely acute urinary tract infection n


History of TIA in 2016


Underlying history of coronary artery disease status post stent


Hypertension


History of bilateral hip replacement











Plan: 


I will not repeat MRI of the brain since the patient had a recent 1 on 

2024 which is unremarkable for acute or subacute stroke or any enha

ncement.  She feels back to baseline.


If patient does have any new neurological deficit then I will pursue MRI


The echo and carotid duplex is ordered by the primary team is pending


Orthostatic vitals is also ordered and is pending


I recommend the patient to follow-up with a neurologist as an outpatient within 

2 to 3 weeks.





Will reassess the patient status tomorrow.  Otherwise no additional neurological

workup at this time.


Thank you for the consultation





Time with Patient: Greater than 30

## 2024-07-29 NOTE — HP
HISTORY AND PHYSICAL



CHIEF COMPLAINT:

Dizziness and ataxia.



HISTORY OF PRESENT ILLNESS:

This is a 78-year-old woman with a past medical history of multiple medical problems

including CAD, COPD, hypertension, was complaining of unsteadiness in feet since

Friday.  The patient also has some hacking cough and the patient came to [facility] today and admitted for further evaluation and treatment.  Initial evaluation

including a CAT scan is negative.  MRI has been ordered.  Neurology evaluation has been

sought.  The patient also had a CAD stent.  EKG showed nonspecific ST-T changes.  There

is no history of any fever, rigors, or chills at this time.  Initial viral testing is

negative.  The patient has evidence of UTI.  There is no history of dysuria.



PAST MEDICAL HISTORY:

Reviewed, rest of medications and chart is also reviewed.



HOME MEDICATIONS:

Reviewed include HydroDIURIL, dose and rest of medications reviewed.



ALLERGIES:

Ambien.



FAMILY HISTORY:

History of DJD.



SOCIAL HISTORY:

Previous history of smoking, occasional alcohol.



REVIEW OF SYSTEMS:

A 14-point review is negative except as mentioned earlier.



PHYSICAL EXAMINATION:

VITAL SIGNS:  Pulse is 68, blood pressure 164/60, and respirations 18.

HEENT:  Conjunctivae normal.

NECK:  No jugular venous distention.

CARDIAC:  S1, S2.

RESPIRATIONS:  Diminished at the bases, scattered rhonchi.  No crackles.

ABDOMEN:  Soft, nontender.

LEGS:  No edema, no swelling.

NERVOUS SYSTEM:  Diffusely weak, otherwise no sensory or cerebral incoordination.

GAIT:  Not assessed.



LABORATORY DATA:

Potassium 5.2, rest of the labs are noted.



ASSESSMENT:

1. Ataxia, dizziness and weakness for evaluation, rule out acute TIA.

2. Rule out acute UTI.

3. Hyperkalemia, mild.

4. Coronary artery disease stent history.

5. Cough, possible acute bronchitis.

6. Chronic obstructive pulmonary disease.

7. Hypertension.

8. Bilateral nodule history.

9. Multiple complex medical issues.



RECOMMENDATIONS AND DISCUSSION:

This 78-year-old woman presented with multiple complex medical issues, we will monitor

the patient closely.  Continue the current medications, continue symptomatic

management.  We will obtain the neurology evaluation including MRI scan.  Otherwise, I

would also recommend blood testing including serum procalcitonin.  See orders for

details.  Prognosis guarded.  Discussed with family at length.  Further recommendations

to follow.  A 2D echo also will be ordered to complete the workup.





MMODL / IJN: [id number] / Job#: [id number]

## 2024-07-29 NOTE — US
EXAMINATION TYPE: US carotid duplex BILAT

 

DATE OF EXAM: 7/29/2024

 

COMPARISON: US 2024

 

CLINICAL INDICATION: Female, 78 years old with history of weak, dizzy;

 

TECHNIQUE: Carotid duplex ultrasound examination. Indirect Doppler criteria was utilized.

 

FINDINGS:

 

EXAM MEASUREMENTS: 

 

RIGHT:  Peak Systolic Velocity (PSV) cm/sec

----- Right CCA:  56.1  

----- Right ICA:  85.4     

----- Right ECA:  90.5   

ICA/CCA ratio:  1.5    

 

RIGHT:  End Diastole cm/sec

----- Right CCA:  10.1   

----- Right ICA:  19.7      

----- Right ECA:  13.1     

 

LEFT:  Peak Systolic Velocity (PSV) cm/sec

----- Left CCA:  78.6  

----- Left ICA:  123.0   

----- Left ECA:  72.9  

ICA/CCA ratio:  1.6  

 

LEFT:  End Diastole cm/sec

----- Left CCA:  19.5  

----- Left ICA:  35.9   

----- Left ECA:  1.1 

 

VERTEBRALS (direction of flow):

Right Vertebral: Antegrade

Left Vertebral: Antegrade

 

Rhythm:  Arrhythmia

 

No significant stenois

 

IMPRESSION:  

No evidence for hemodynamically significant stenosis.   

 

 

 

 

 

 

Criteria for Assigning % of Stenosis / Diameter reduction

(Estimation based on the indirect measurements of the internal carotid artery velocities (ICA PSV).

1.  Normal (no stenosis)=ICA PSV < 125 cm/s: ratio < 2.0: ICA EDV<40 cm/s.

2. Less than 50% stenosis=ICA PSV < 125 cm/s: ratio < 2.0: ICA EDV<40 cm/s.

3.  50 to 69% stenosis=ICA PSV of 125 to 230 cm/s: ration 2.0 ? 4.0: ICA EDV  cm/s.

4.  Greater than 70% stenosis to near occlusion= ICA PSV > 230 cm/s: ratio > 4.0: ICA EDV > 100 cm/s.
 

5.  Near occlusion= ICA PSV velocities may be low or undetectable: variable ratio and ICA EDV.

6.  Total occlusion=unable to detect flow.

## 2024-07-30 VITALS — DIASTOLIC BLOOD PRESSURE: 67 MMHG | TEMPERATURE: 98 F | RESPIRATION RATE: 18 BRPM | SYSTOLIC BLOOD PRESSURE: 162 MMHG

## 2024-07-30 VITALS — HEART RATE: 73 BPM

## 2024-07-30 LAB
ALBUMIN SERPL-MCNC: 3.6 G/DL (ref 3.5–5)
ALP SERPL-CCNC: 119 U/L (ref 38–126)
ALT SERPL-CCNC: 10 U/L (ref 4–34)
ANION GAP SERPL CALC-SCNC: 4 MMOL/L
AST SERPL-CCNC: 18 U/L (ref 14–36)
BASOPHILS # BLD AUTO: 0.1 K/UL (ref 0–0.2)
BASOPHILS NFR BLD AUTO: 1 %
BUN SERPL-SCNC: 15 MG/DL (ref 7–17)
CALCIUM SPEC-MCNC: 9.6 MG/DL (ref 8.4–10.2)
CHLORIDE SERPL-SCNC: 108 MMOL/L (ref 98–107)
CO2 SERPL-SCNC: 27 MMOL/L (ref 22–30)
EOSINOPHIL # BLD AUTO: 0.2 K/UL (ref 0–0.7)
EOSINOPHIL NFR BLD AUTO: 2 %
ERYTHROCYTE [DISTWIDTH] IN BLOOD BY AUTOMATED COUNT: 4.43 M/UL (ref 3.8–5.4)
ERYTHROCYTE [DISTWIDTH] IN BLOOD: 14.6 % (ref 11.5–15.5)
GLUCOSE SERPL-MCNC: 89 MG/DL (ref 74–99)
HCT VFR BLD AUTO: 37 % (ref 34–46)
HGB BLD-MCNC: 11.6 GM/DL (ref 11.4–16)
LYMPHOCYTES # SPEC AUTO: 2.4 K/UL (ref 1–4.8)
LYMPHOCYTES NFR SPEC AUTO: 28 %
MCH RBC QN AUTO: 26.1 PG (ref 25–35)
MCHC RBC AUTO-ENTMCNC: 31.2 G/DL (ref 31–37)
MCV RBC AUTO: 83.5 FL (ref 80–100)
MONOCYTES # BLD AUTO: 0.4 K/UL (ref 0–1)
MONOCYTES NFR BLD AUTO: 5 %
NEUTROPHILS # BLD AUTO: 5.5 K/UL (ref 1.3–7.7)
NEUTROPHILS NFR BLD AUTO: 63 %
PLATELET # BLD AUTO: 247 K/UL (ref 150–450)
POTASSIUM SERPL-SCNC: 4.2 MMOL/L (ref 3.5–5.1)
PROT SERPL-MCNC: 5.7 G/DL (ref 6.3–8.2)
SODIUM SERPL-SCNC: 139 MMOL/L (ref 137–145)
WBC # BLD AUTO: 8.8 K/UL (ref 3.8–10.6)

## 2024-07-30 RX ADMIN — PANTOPRAZOLE SODIUM SCH MG: 40 TABLET, DELAYED RELEASE ORAL at 09:35

## 2024-07-30 NOTE — DS
DISCHARGE SUMMARY



FINAL DIAGNOSES:

1. Ataxia, dizziness, weakness, TIA ruled out.

2. Acute UTI ruled out.

3. Hyperkalemia.

4. Possible acute purulent tracheobronchitis.

5. Chronic obstructive pulmonary disease.

6. Hypertension.

Please note, the patient does not have UTI and urine cultures negative.



DISCHARGE DISPOSITION:

The patient will be discharged in stable condition, guarded prognosis.



HISTORY OF PRESENT ILLNESS:

This is a 78-year-old woman with a past medical history of multiple medical problems,

admitted with multiple symptomatology.  The patient's cultures are negative.  The

patient was given antibiotics for possible tracheobronchitis.  Patient improved

significantly.



PHYSICAL EXAMINATION:

CARDIOVASCULAR:  S1, S2.

RESPIRATIONS:  Few scattered rhonchi.



DISCHARGE MEDICATIONS:

Resume the home medications and short course of antibiotics, Ceftin 500 b.i.d. for 3

days for tracheobronchitis and continue rest of medications.  Follow with Dr. Slaughter.





EDILMA / SALVADOR: [id number] / Job#: [id number]

## 2024-07-30 NOTE — CA
Transthoracic Echo Report 

 Name: Pat Donald 

 MRN:    [id number] 

 Age:    78     Gender:     F 

 

 :    1945 

 Exam Date:     2024 07:39 

 Exam Location: [facility] 

 Ht (in):     63     Wt (lb):     165 

 Ordering Physician:        Ann Miranda MD 

 Attending/Referring Phys: 

 Technician         Renetta King RDCS 

 Procedure CPT: 

 Indications:       chf 

 

 Cardiac Hx: 

 Technical Quality:      Fair 

 Contrast 1:                                Total Dose (mL): 

 Contrast 2:                                Total Dose (mL): 

 

 MEASUREMENTS  (Male / Female) Normal Values 

 2D ECHO 

 LV Diastolic Diameter PLAX        5.2 cm                4.2 - 5.9 / 3.9 - 5.3 cm 

 LV Systolic Diameter PLAX         3.7 cm                 

 IVS Diastolic Thickness           1.3 cm                0.6 - 1.0 / 0.6 - 0.9 cm 

 LVPW Diastolic Thickness          1.2 cm                0.6 - 1.0 / 0.6 - 0.9 cm 

 LV Relative Wall Thickness        0.5                    

 RV Internal Dim ED PLAX           3.5 cm                 

 LA Systolic Diameter LX           3.9 cm                3.0 - 4.0 / 2.7 - 3.8 cm 

 LV Diastolic Volume MOD 4C        117.3 cm???              

 LV Systolic Volume MOD 4C         51.6 cm???               

 LV Ejection Fraction MOD 4C       56.0 %                 

 LV Cardiac Index MOD 4C           1885.5 cm???/min???m???      

 LV Diastolic Length 4C            8.3 cm                 

 LV Systolic Length 4C             6.4 cm                 

 LV Diastolic Volume MOD 2C        78.0 cm???               

 LV Systolic Volume MOD 2C         38.3 cm???               

 LV Ejection Fraction MOD 2C       50.9 %                 

 LV Cardiac Index MOD 2C           1139.7 cm???/min???m???      

 LV Diastolic Length 2C            8.0 cm                 

 LV Systolic Length 2C             6.0 cm                 

 LA Volume                         71.1 cm???              18 - 58 / 22 - 52 cm??? 

 LA Volume Index                   38.5 cm???/m???           16 - 28 cm???/m??? 

 

 M-MODE 

 Aortic Root Diameter MM           3.1 cm                 

 AV Cusp Separation MM             1.8 cm                 

 

 DOPPLER 

 AV Peak Velocity                  158.2 cm/s             

 AV Peak Gradient                  10.0 mmHg              

 AI Peak Velocity                  333.6 cm/s             

 AI Peak Gradient                  44.5 mmHg              

 AI Pressure Half Time             1203.8 ms              

 MV Area PHT                       2.1 cm???                

 Mitral E Point Velocity           66.9 cm/s              

 Mitral A Point Velocity           114.9 cm/s             

 Mitral E to A Ratio               0.6                    

 MV Deceleration Time              357.5 ms               

 TR Peak Velocity                  315.5 cm/s             

 TR Peak Gradient                  39.8 mmHg              

 Right Ventricular Systolic Press  44.8 mmHg              

 

 

 FINDINGS 

 Left Ventricle 

 Left ventricular ejection fraction is estimated at 55-60 %. Left ventricular  

 cavity size normal.  Mildly increased septal wall thickness. Mildly increased  

 posterior wall thickness. 

 

 Right Ventricle 

 Mild right ventricular dilatation. Moderate pulmonary hypertension. Right  

 ventricular systolic pressure estimated at 45 mm hg. 

 

 Right Atrium 

 Normal right atrial size. No right atrial thrombus or mass seen. 

 

 Left Atrium 

 Moderately increased left atrial volume. Mildly increased left atrial area. No  

 left atrial thrombus or mass present. 

 

 Mitral Valve 

 Structurally normal mitral valve. Mitral annular calcification. Trace mitral  

 regurgitation. 

 

 Aortic Valve 

 Trileaflet aortic valve. Aortic valve sclerosis. Trace to mild aortic  

 regurgitation. 

 

 Tricuspid Valve 

 Structurally normal tricuspid valve. Mild tricuspid regurgitation. 

 

 Pulmonic Valve 

 Pulmonic valve not well visualized. Trace pulmonic regurgitation. 

 

 Pericardium 

 No pericardial effusion. No pleural effusion. 

 

 Aorta 

 Normal size aortic root and proximal ascending aorta. 

 

 CONCLUSIONS 

 Diagnosis 

 Dizziness presyncope and congestive heart failure 

 

 Preserved LV systolic function ejection fraction greater than 55% 

 Mild RV enlargement 

 Left atrial enlargement, moderate 

 Previewed by:  

 Dr. Emir Eller MD 

 (Electronically Signed) 

 Final Date:      2024 15:30

## 2024-07-30 NOTE — P.PN
Subjective


Progress Note Date: 07/30/24


I am following-up with patient and she states she continues to be doing well.  

No further unsteady gait.  Per the nurse she was walking normal with no issues.


Denies of any new neurological issues.








Objective





- Vital Signs


Vital signs: 


                                   Vital Signs











Temp  98.0 F   07/30/24 07:00


 


Pulse  73   07/30/24 08:00


 


Resp  18   07/30/24 08:00


 


BP  162/67   07/30/24 07:00


 


Pulse Ox  91 L  07/30/24 07:00


 


FiO2      








                                 Intake & Output











 07/29/24 07/30/24 07/30/24





 18:59 06:59 18:59


 


Weight  74.843 kg 


 


Other:   


 


  Voiding Method  Toilet Toilet


 


  # Voids  2 














- Exam


GENERAL: The patient is lying in bed and is not in acute distress.





NEUROLOGICAL:


Higher mental function: The patient is awake, alert, oriented to self, place and

time.  Patient is following commands.  No aphasia and no neglect.


Cranial nerves: The pupils are round, equal and reactive to light and 

accommodation.  Visual fields are full to confrontation throughout.  Extraocular

movement is intact no nystagmus is noted.  Facial sensation is normal to touch 

throughout.  The facial strength is normal throughout.  Hearing is normal fred

aterally to hand rub.  Tongue is midline and moved side-to-side without any 

difficulty.  No dysarthria is noted.  Shoulder shrug is normal bilaterally.


Motor:Gait is normal.  The strength is 5 over 5 throughout.  Normal tone and 

bulk.  


Cerebellum: Normal finger to nose heel to chin bilaterally.


Sensation: Sensation is normal to touch throughout.


Reflexes (right/left):Left knee is 0 and bilateral ankles are 1+.  Otherwise 2+ 

throughout.


Plantars are downgoing bilaterally.








Some of the work-up during this hospital visit consisted of:


Intervals unremarkable


Potassium is 5.3 and there is slight hemolysis


Urine Analysis is a leukocyte Estrace is moderate, urine white blood cell is 43,

urine bacteria is rare


CT of the head is reported as no acute intracranial process.  Frontal lobe 

asymmetrical involuted.  I personally reviewed the CT and there is no acute or 

subacute process.  I personally do not feel there is any difference in the CT 

compared to the prior CTs even going back to 2016/2017.  Patient does have 

bilateral frontal atrophy but again I felt that she had this in the past.


Carotid duplex: No evidence for hemodynamically significant stenosis.





Of Note, patient had MRI of the brain most recent in 04/23/2024 and it is 

reported no MRI evidence for recent infarct.  There is mild to moderate cerebral

atrophy and moderate to advanced probable chronic small vessel ischemic changes 

redemonstrated with some interval degenerative progression from 2021.  No 

suspicious enhancement is seen.








- Labs


CBC & Chem 7: 


                                 07/30/24 06:17





                                 07/30/24 06:17


Labs: 


                  Abnormal Lab Results - Last 24 Hours (Table)











  07/29/24 07/29/24 07/30/24 Range/Units





  13:18 18:48 06:17 


 


Chloride    108 H  ()  mmol/L


 


C-Reactive Protein  1.2 H    (<1.0)  mg/dL


 


Total Protein    5.7 L  (6.3-8.2)  g/dL


 


U Benzodiazepines Scrn   Detected H   (NotDetected)  








                      Microbiology - Last 24 Hours (Table)











 07/28/24 20:52 Urine Culture - Final





 Urine,Voided 














Assessment and Plan


Assessment: 


This is a 78-year-old woman presents to the emergency department because she 

felt she is off balance for the past few days.  Seems that she has acute urinary

tract infection.  She felt drastically better with antibiotic and meclizine.  

She feels her gait is normal and there is no balance issues.





Acute unsteady gait possibly acute urinary tract infection possible due to acute

respiratory tract infection since has recent cough.  Also patient has positive 

orthostatic vitals which can contribute to her symptoms.  Examination there is 

no focal deficit and patient is back to baseline.  Had multiple MRI of the brain

and the most recent was on 04/23/2024 and there is no acute or subacute stroke 

and there is no enhancement.  


Positive orthostatic hypotension


Likely acute urinary tract infection n


History of TIA in 2016


Underlying history of coronary artery disease status post stent


Hypertension


History of bilateral hip replacement











Plan: 


I will not repeat MRI of the brain since the patient had a recent 1 on 

04/23/2024 which is unremarkable for acute or subacute stroke or any 

enhancement.  She feels back to baseline and continue to be doing well.


For positive orthostatic hypotension will defer management to her primary team.


If patient does have any new neurological deficit then I will pursue MRI


2D echo is ordered by primary team.


I recommend the patient to follow-up with a neurologist as an outpatient within 

2 to 3 weeks.  She stated she followed-up with Dr. Hernández's P.A. once and did 

not feels things were addressed per patient.  She can follow-up with different 

neurologist as outpatient as second opinion.





The plan is discussed with patient and her nurse.


There is no further neurological work-up.  Will sign off.  Please reconsult if 

needed.





Time with Patient: Less than 30